# Patient Record
Sex: MALE | Race: WHITE | NOT HISPANIC OR LATINO
[De-identification: names, ages, dates, MRNs, and addresses within clinical notes are randomized per-mention and may not be internally consistent; named-entity substitution may affect disease eponyms.]

---

## 2019-06-13 PROBLEM — Z00.00 ENCOUNTER FOR PREVENTIVE HEALTH EXAMINATION: Status: ACTIVE | Noted: 2019-06-13

## 2019-06-18 ENCOUNTER — FORM ENCOUNTER (OUTPATIENT)
Age: 64
End: 2019-06-18

## 2019-06-19 ENCOUNTER — APPOINTMENT (OUTPATIENT)
Dept: ORTHOPEDIC SURGERY | Facility: CLINIC | Age: 64
End: 2019-06-19
Payer: MEDICARE

## 2019-06-19 ENCOUNTER — OUTPATIENT (OUTPATIENT)
Dept: OUTPATIENT SERVICES | Facility: HOSPITAL | Age: 64
LOS: 1 days | End: 2019-06-19
Payer: MEDICARE

## 2019-06-19 VITALS — BODY MASS INDEX: 31.81 KG/M2 | WEIGHT: 240 LBS | HEIGHT: 73 IN

## 2019-06-19 VITALS — DIASTOLIC BLOOD PRESSURE: 80 MMHG | OXYGEN SATURATION: 99 % | SYSTOLIC BLOOD PRESSURE: 120 MMHG | HEART RATE: 76 BPM

## 2019-06-19 DIAGNOSIS — Z87.891 PERSONAL HISTORY OF NICOTINE DEPENDENCE: ICD-10-CM

## 2019-06-19 DIAGNOSIS — Z86.79 PERSONAL HISTORY OF OTHER DISEASES OF THE CIRCULATORY SYSTEM: ICD-10-CM

## 2019-06-19 DIAGNOSIS — I63.9 CEREBRAL INFARCTION, UNSPECIFIED: ICD-10-CM

## 2019-06-19 DIAGNOSIS — Z82.49 FAMILY HISTORY OF ISCHEMIC HEART DISEASE AND OTHER DISEASES OF THE CIRCULATORY SYSTEM: ICD-10-CM

## 2019-06-19 DIAGNOSIS — Z86.59 PERSONAL HISTORY OF OTHER MENTAL AND BEHAVIORAL DISORDERS: ICD-10-CM

## 2019-06-19 DIAGNOSIS — Z83.3 FAMILY HISTORY OF DIABETES MELLITUS: ICD-10-CM

## 2019-06-19 PROCEDURE — 72020 X-RAY EXAM OF SPINE 1 VIEW: CPT | Mod: 26

## 2019-06-19 PROCEDURE — 72020 X-RAY EXAM OF SPINE 1 VIEW: CPT

## 2019-06-19 PROCEDURE — 99203 OFFICE O/P NEW LOW 30 MIN: CPT

## 2019-06-19 PROCEDURE — 73502 X-RAY EXAM HIP UNI 2-3 VIEWS: CPT

## 2019-06-19 PROCEDURE — 73502 X-RAY EXAM HIP UNI 2-3 VIEWS: CPT | Mod: 26,LT

## 2019-06-29 LAB
BASOPHILS # BLD AUTO: 0.06 K/UL
BASOPHILS NFR BLD AUTO: 1 %
CRP SERPL-MCNC: 3.15 MG/DL
DEPRECATED D DIMER PPP IA-ACNC: 1794 NG/ML DDU
EOSINOPHIL # BLD AUTO: 0.51 K/UL
EOSINOPHIL NFR BLD AUTO: 8.5 %
ERYTHROCYTE [SEDIMENTATION RATE] IN BLOOD BY WESTERGREN METHOD: 70 MM/HR
HCT VFR BLD CALC: 39.3 %
HGB BLD-MCNC: 11.5 G/DL
IMM GRANULOCYTES NFR BLD AUTO: 0.3 %
LYMPHOCYTES # BLD AUTO: 1.47 K/UL
LYMPHOCYTES NFR BLD AUTO: 24.6 %
MAN DIFF?: NORMAL
MCHC RBC-ENTMCNC: 28.1 PG
MCHC RBC-ENTMCNC: 29.3 GM/DL
MCV RBC AUTO: 96.1 FL
MONOCYTES # BLD AUTO: 0.79 K/UL
MONOCYTES NFR BLD AUTO: 13.2 %
NEUTROPHILS # BLD AUTO: 3.12 K/UL
NEUTROPHILS NFR BLD AUTO: 52.4 %
PLATELET # BLD AUTO: 316 K/UL
RBC # BLD: 4.09 M/UL
RBC # FLD: 15.2 %
WBC # FLD AUTO: 5.97 K/UL

## 2019-06-30 RX ORDER — CARVEDILOL 25 MG/1
TABLET, FILM COATED ORAL
Refills: 0 | Status: ACTIVE | COMMUNITY

## 2019-06-30 RX ORDER — ATORVASTATIN CALCIUM 80 MG/1
TABLET, FILM COATED ORAL
Refills: 0 | Status: ACTIVE | COMMUNITY

## 2019-07-12 ENCOUNTER — APPOINTMENT (OUTPATIENT)
Dept: ORTHOPEDIC SURGERY | Facility: CLINIC | Age: 64
End: 2019-07-12

## 2019-07-12 ENCOUNTER — APPOINTMENT (OUTPATIENT)
Dept: INTERVENTIONAL RADIOLOGY/VASCULAR | Facility: HOSPITAL | Age: 64
End: 2019-07-12

## 2019-09-14 PROBLEM — Z82.49 FAMILY HISTORY OF HEART DISEASE: Status: ACTIVE | Noted: 2019-09-14

## 2019-09-14 PROBLEM — I63.9 STROKE: Status: RESOLVED | Noted: 2019-09-14 | Resolved: 2019-09-14

## 2019-09-14 PROBLEM — Z83.3 FAMILY HISTORY OF DIABETES MELLITUS: Status: ACTIVE | Noted: 2019-09-14

## 2019-09-14 PROBLEM — Z87.891 FORMER SMOKER: Status: ACTIVE | Noted: 2019-09-14

## 2019-09-14 PROBLEM — Z86.59 HISTORY OF ANXIETY: Status: RESOLVED | Noted: 2019-09-14 | Resolved: 2019-09-14

## 2019-09-14 PROBLEM — Z86.79 HISTORY OF CORONARY ARTERY DISEASE: Status: RESOLVED | Noted: 2019-09-14 | Resolved: 2019-09-14

## 2019-09-14 NOTE — REVIEW OF SYSTEMS
[Lower Ext Edema] : lower extremity edema [Anxiety] : anxiety [Fever] : no fever [FreeTextEntry9] : other joint pain [Chills] : no chills [de-identified] : rash

## 2019-09-14 NOTE — DISCUSSION/SUMMARY
[de-identified] : 65 yo male recovering slowly less than 1 months s/p DAIR for periprosthetic infection.  While history and physical exam do not clearly demonstrate eradication of infection, neither do they reveal treatment failure.  I recommend getting labs today and continuing prescribed antibiotic course.  If symptoms worse, f/u ASAP.  If symptoms start to markedly improve, continue current care.  If symptoms fail to improve rapidly in next few weeks, would encourage left hip aspiration under image guidance.  My staff will help to arrange.

## 2019-09-14 NOTE — PHYSICAL EXAM
[LE] : Sensory: Intact in bilateral lower extremities [PT] : posterior tibial 2+ and symmetric bilaterally [de-identified] : Alert and oriented x3.\par Well appearing in NAD.\par LEft coxalgic gait.\par Lumbar spine: No visible deformity. No tenderness. No radicular pain with passive straight leg raise bilaterally. \par Pelvis: No pelvic obliquity.  No tenderness.\par Limb lengths: No visible leg length difference.\par Right hip: Skin intact. Well healed surgical scar.  No erythema, ecchymosis or swelling.  No tenderness. Unrestricted ROM. Impingement, SHWETHA and Stinchfield painless. Flexor/abductor power 5/5.\par Left hip: Healing posterolateral surgical scar without dehiscence.  Mild to moderate faye-incisional erythema with mild induration. No ecchymosis.  or swelling.  Moderate tenderness. Moderately painful and restricted ROM. Flexor/abductor power 4-/5, limited by pain.\par Right knee: Skin intact. No erythema, ecchymosis or effusion. No deformity.  No swelling. No tenderness.  Patellar grind painless.  Painless ROM.  \par Left knee:  Skin intact. No erythema, ecchymosis or effusion. No deformity.  No swelling. No tenderness.  Patellar grind painless.  Painless ROM.   [de-identified] : XR LS spine lateral: There is no significant loss of vertebral body height. There is mild retrolisthesis of L3 on L4. There is multilevel discogenic degenerative disease of the lumbar spine as manifested by disc space narrowing and endplate osteophytosis, most pronounced at L3-L4 and L4-L5.\par \par XR pelvis and left hip: There are bilateral total hip arthroplasties, which are normal alignment without evidence for hardware complication. There is no acute fracture or dislocation. There is no visible soft tissue abnormality.

## 2019-09-14 NOTE — HISTORY OF PRESENT ILLNESS
[de-identified] : 63 yo male with h/o uncomplicated right THR underwent left THR in [place] 4/25/19 complicated by wound problems and infection with DAIR 5/22/2019.  Care was in [place] where he lives.  Has had ongoing pain and redness around the surgical site.  Although it has been improving, this has been very slow and he is here seeking second opinion.  Still on IV antibiotics.

## 2019-10-30 ENCOUNTER — FORM ENCOUNTER (OUTPATIENT)
Age: 64
End: 2019-10-30

## 2019-10-31 ENCOUNTER — OUTPATIENT (OUTPATIENT)
Dept: OUTPATIENT SERVICES | Facility: HOSPITAL | Age: 64
LOS: 1 days | End: 2019-10-31
Payer: MEDICARE

## 2019-10-31 ENCOUNTER — APPOINTMENT (OUTPATIENT)
Dept: ORTHOPEDIC SURGERY | Facility: CLINIC | Age: 64
End: 2019-10-31
Payer: MEDICARE

## 2019-10-31 VITALS — BODY MASS INDEX: 31.54 KG/M2 | WEIGHT: 238 LBS | HEIGHT: 73 IN

## 2019-10-31 DIAGNOSIS — Z01.818 ENCOUNTER FOR OTHER PREPROCEDURAL EXAMINATION: ICD-10-CM

## 2019-10-31 LAB
ANION GAP SERPL CALC-SCNC: 13 MMOL/L — SIGNIFICANT CHANGE UP (ref 5–17)
APPEARANCE UR: CLEAR — SIGNIFICANT CHANGE UP
APTT BLD: 28.9 SEC — SIGNIFICANT CHANGE UP (ref 27.5–36.3)
B PERT IGG+IGM PNL SER: SIGNIFICANT CHANGE UP
BACTERIA # UR AUTO: PRESENT /HPF
BILIRUB UR-MCNC: ABNORMAL
BUN SERPL-MCNC: 26 MG/DL — HIGH (ref 7–23)
CALCIUM SERPL-MCNC: 9.5 MG/DL — SIGNIFICANT CHANGE UP (ref 8.4–10.5)
CHLORIDE SERPL-SCNC: 105 MMOL/L — SIGNIFICANT CHANGE UP (ref 96–108)
CO2 SERPL-SCNC: 26 MMOL/L — SIGNIFICANT CHANGE UP (ref 22–31)
COD CRY URNS QL: ABNORMAL /HPF
COLOR FLD: SIGNIFICANT CHANGE UP
COLOR SPEC: SIGNIFICANT CHANGE UP
COMMENT - FLUIDS: SIGNIFICANT CHANGE UP
CREAT SERPL-MCNC: 1.28 MG/DL — SIGNIFICANT CHANGE UP (ref 0.5–1.3)
CRP SERPL-MCNC: 26.14 MG/DL — HIGH (ref 0–0.4)
DIFF PNL FLD: NEGATIVE — SIGNIFICANT CHANGE UP
EPI CELLS # UR: ABNORMAL /HPF (ref 0–5)
ERYTHROCYTE [SEDIMENTATION RATE] IN BLOOD: 50 MM/HR — HIGH
FLUID INTAKE SUBSTANCE CLASS: SIGNIFICANT CHANGE UP
FLUID SEGMENTED GRANULOCYTES: 98 % — SIGNIFICANT CHANGE UP
GLUCOSE SERPL-MCNC: 108 MG/DL — HIGH (ref 70–99)
GLUCOSE UR QL: NEGATIVE — SIGNIFICANT CHANGE UP
GRAM STN FLD: SIGNIFICANT CHANGE UP
HBA1C BLD-MCNC: 5.5 % — SIGNIFICANT CHANGE UP (ref 4–5.6)
HCT VFR BLD CALC: 42 % — SIGNIFICANT CHANGE UP (ref 39–50)
HGB BLD-MCNC: 13.5 G/DL — SIGNIFICANT CHANGE UP (ref 13–17)
HYALINE CASTS # UR AUTO: ABNORMAL /LPF (ref 0–2)
INR BLD: 1.09 — SIGNIFICANT CHANGE UP (ref 0.88–1.16)
KETONES UR-MCNC: ABNORMAL MG/DL
LEUKOCYTE ESTERASE UR-ACNC: NEGATIVE — SIGNIFICANT CHANGE UP
LYMPHOCYTES # FLD: 1 % — SIGNIFICANT CHANGE UP
MCHC RBC-ENTMCNC: 30.1 PG — SIGNIFICANT CHANGE UP (ref 27–34)
MCHC RBC-ENTMCNC: 32.1 GM/DL — SIGNIFICANT CHANGE UP (ref 32–36)
MCV RBC AUTO: 93.5 FL — SIGNIFICANT CHANGE UP (ref 80–100)
MONOS+MACROS # FLD: 1 % — SIGNIFICANT CHANGE UP
NITRITE UR-MCNC: NEGATIVE — SIGNIFICANT CHANGE UP
NRBC # BLD: 0 /100 WBCS — SIGNIFICANT CHANGE UP (ref 0–0)
PH UR: 6 — SIGNIFICANT CHANGE UP (ref 5–8)
PLATELET # BLD AUTO: 195 K/UL — SIGNIFICANT CHANGE UP (ref 150–400)
POTASSIUM SERPL-MCNC: 4.5 MMOL/L — SIGNIFICANT CHANGE UP (ref 3.5–5.3)
POTASSIUM SERPL-SCNC: 4.5 MMOL/L — SIGNIFICANT CHANGE UP (ref 3.5–5.3)
PROT UR-MCNC: 30 MG/DL
PROTHROM AB SERPL-ACNC: 12.3 SEC — SIGNIFICANT CHANGE UP (ref 10–12.9)
RBC # BLD: 4.49 M/UL — SIGNIFICANT CHANGE UP (ref 4.2–5.8)
RBC # FLD: 12.6 % — SIGNIFICANT CHANGE UP (ref 10.3–14.5)
RBC CASTS # UR COMP ASSIST: < 5 /HPF — SIGNIFICANT CHANGE UP
RCV VOL RI: HIGH /UL (ref 0–5)
SODIUM SERPL-SCNC: 144 MMOL/L — SIGNIFICANT CHANGE UP (ref 135–145)
SP GR SPEC: 1.02 — SIGNIFICANT CHANGE UP (ref 1–1.03)
SPECIMEN SOURCE FLD: SIGNIFICANT CHANGE UP
SPECIMEN SOURCE: SIGNIFICANT CHANGE UP
SYNOVIAL CRYSTALS CLARITY: ABNORMAL
SYNOVIAL CRYSTALS COLOR: ABNORMAL
SYNOVIAL CRYSTALS ID: SIGNIFICANT CHANGE UP
SYNOVIAL CRYSTALS TUBE: SIGNIFICANT CHANGE UP
TOTAL NUCLEATED CELL COUNT, BODY FLUID: HIGH /UL (ref 0–5)
TUBE TYPE: SIGNIFICANT CHANGE UP
UROBILINOGEN FLD QL: 0.2 E.U./DL — SIGNIFICANT CHANGE UP
WBC # BLD: 11.66 K/UL — HIGH (ref 3.8–10.5)
WBC # FLD AUTO: 11.66 K/UL — HIGH (ref 3.8–10.5)
WBC UR QL: < 5 /HPF — SIGNIFICANT CHANGE UP

## 2019-10-31 PROCEDURE — 71046 X-RAY EXAM CHEST 2 VIEWS: CPT

## 2019-10-31 PROCEDURE — 87184 SC STD DISK METHOD PER PLATE: CPT

## 2019-10-31 PROCEDURE — 89051 BODY FLUID CELL COUNT: CPT

## 2019-10-31 PROCEDURE — 85027 COMPLETE CBC AUTOMATED: CPT

## 2019-10-31 PROCEDURE — 93005 ELECTROCARDIOGRAM TRACING: CPT

## 2019-10-31 PROCEDURE — 85730 THROMBOPLASTIN TIME PARTIAL: CPT

## 2019-10-31 PROCEDURE — 87186 SC STD MICRODIL/AGAR DIL: CPT

## 2019-10-31 PROCEDURE — 80048 BASIC METABOLIC PNL TOTAL CA: CPT

## 2019-10-31 PROCEDURE — 87075 CULTR BACTERIA EXCEPT BLOOD: CPT

## 2019-10-31 PROCEDURE — 85610 PROTHROMBIN TIME: CPT

## 2019-10-31 PROCEDURE — 87086 URINE CULTURE/COLONY COUNT: CPT

## 2019-10-31 PROCEDURE — 87205 SMEAR GRAM STAIN: CPT

## 2019-10-31 PROCEDURE — 87070 CULTURE OTHR SPECIMN AEROBIC: CPT

## 2019-10-31 PROCEDURE — 86140 C-REACTIVE PROTEIN: CPT

## 2019-10-31 PROCEDURE — 89060 EXAM SYNOVIAL FLUID CRYSTALS: CPT

## 2019-10-31 PROCEDURE — 83036 HEMOGLOBIN GLYCOSYLATED A1C: CPT

## 2019-10-31 PROCEDURE — 72020 X-RAY EXAM OF SPINE 1 VIEW: CPT

## 2019-10-31 PROCEDURE — 20610 DRAIN/INJ JOINT/BURSA W/O US: CPT | Mod: LT

## 2019-10-31 PROCEDURE — 71046 X-RAY EXAM CHEST 2 VIEWS: CPT | Mod: 26

## 2019-10-31 PROCEDURE — 73502 X-RAY EXAM HIP UNI 2-3 VIEWS: CPT

## 2019-10-31 PROCEDURE — 85652 RBC SED RATE AUTOMATED: CPT

## 2019-10-31 PROCEDURE — 81001 URINALYSIS AUTO W/SCOPE: CPT

## 2019-10-31 PROCEDURE — 72020 X-RAY EXAM OF SPINE 1 VIEW: CPT | Mod: 26

## 2019-10-31 PROCEDURE — 73502 X-RAY EXAM HIP UNI 2-3 VIEWS: CPT | Mod: 26,LT

## 2019-10-31 PROCEDURE — 93010 ELECTROCARDIOGRAM REPORT: CPT

## 2019-10-31 PROCEDURE — 99214 OFFICE O/P EST MOD 30 MIN: CPT | Mod: 25

## 2019-10-31 NOTE — END OF VISIT
[>50% of Time Spent on Counseling for ____] : Greater than 50% of the encounter time was spent on counseling for [unfilled] [Time Spent: ___ minutes] : I have spent [unfilled] minutes of face to face time with the patient [FreeTextEntry3] : All medical record entries made by Vanessa Hanna acting as a scribe for the performing provider (Rogerio Mead MD and/or JULIA Birmingham) on 10/31/2019. All entries were dictated to me by the performing medical provider. In signing this record, the medical provider affirms that they have personally performed the history, physical exam, assessment and plan and have also directed, reviewed and agreed to the documentation in the chart.

## 2019-10-31 NOTE — DISCUSSION/SUMMARY
[de-identified] : Mr. Maya presents with left hip pain s/p left THR 4/25/19 complicated by wound problems and infection with DAIR 5/22/19. I aspirated 11 cc of cloudy, pus-like fluid from the side of the patient's left greater trochanter/bursa. I informed patient that the fluid appears infected but that I would be sending it to the lab for further analysis. We discussed how the oral antibiotics he was on until 10 days ago may have suppressed the infection, causing his most recent blood work labs to appear normal. I ordered blood tests for infection as well as pre-operative blood tests in case patient needs a revision operation. \par If patient's fluid analysis and blood tests demonstrate that he has an infection, I informed him that we will have to do a two stage revision for infection. In the first stage, a spacer with antibiotic cement will be placed in his hip. I informed patient that I use a spacer that allows some mobility. While he has the spacer, he will be on IV antibiotics for about 6 weeks. If his wound is healing well and his inflammatory markers aren't high, then patient will be put on oral antibiotics for 3-4 weeks. When he's physiologically ready to have another operation, patient will go off of the antibiotics to look for evidence of infection that could have been suppressed by the abx. If he does not show evidence of infection, then he will have a second operation to implant a new hip device. We discussed the risks, benefits and recovery time of this operation.\par I advised patient to take OTC Tylenol and antiinflammatories for pain relief. He should avoid narcotics before an operation.\par Follow up by calling the office by fluid analysis and blood test results.

## 2019-10-31 NOTE — PROCEDURE
[Aspiration] : Aspiration [Left] : of the left [Greater Trochanter] : greater trochanteric bursa [Diagnostic] : Diagnostic [Patient] : patient [Risk] : risk [Benefits] : benefits [Alternatives] : alternatives [Bleeding] : bleeding [Infection] : infection [Allergic Reaction] : allergic reaction [Verbal Consent Obtained] : verbal consent was obtained prior to the procedure [Alcohol] : Alcohol [Betadine] : Betadine [Ethyl Chloride Spray] : ethyl chloride spray was used as a topical anesthetic [Lateral] : lateral [18] : an 18-gauge [1.5  inch] : 1.5 inch needle [___ mL Fluid] : [unfilled] mL of [Cloudy] : cloudy [Same Needle/New Syringe] : the syringe was changed and the same needle was left in place and [Bandage Applied] : a bandage [Tolerated Well] : The patient tolerated the procedure well [None] : none [No Strenuous Activity___day(s)] : avoid strenuous activity for [unfilled] day(s) [PRN] : as needed

## 2019-10-31 NOTE — HISTORY OF PRESENT ILLNESS
[de-identified] : 64 year old M presents today for follow up evaluation of left hip pain s/p left THR 4/25/19 complicated by wound problems and infection with DAIR 5/22/19. He reports moderate, daily left hip area pain localized to the thigh and side of the hip. He also reports left thigh and knee pain as well as a feeling of tingling in the anterior left thigh. He denies back pain. The pain occurs upon rising in the morning and with activity. He has difficulty placing shoes on the left foot. Patient can walk 5-10 blocks with a moderate limp and a cane for long walks. He uses a rail to ascend and descend stairs. He can enter public transportation and sit comfortably in an ordinary chair. He reports that he stopped taking oral antibiotics 10 days ago and had recent blood work that was not concerning for infection.\par He reports that he was diagnosed by another physician with trochanteric bursitis about 2-3 months ago and was prescribed physical therapy. He states that his left thigh and knee pain worsened with PT and was especially severe upon rising in the morning. Then, he had a cortisone injection to the side of the hip which he reports did not help to improve his symptoms.

## 2019-10-31 NOTE — PHYSICAL EXAM
[de-identified] : Well appearing. NAD. Alert and oriented x 3. No respiratory distress.\par Bilateral upper extremities: Skin intact. No deformity. Painless active ROM without evident restriction.\par Cervical, thoracic and lumbar spine: No visible deformity. Painless active ROM without evident restriction. No radicular pain on passive straight leg raise bilaterally.\par \par Pelvis: No pelvic obliquity. No tenderness.\par Leg lengths: Left leg ~1-2 mm shorter than right.\par \par Gait: Left coxalgic.\par Ambulatory assist devices: None.\par \par Right Hip:\par Skin intact. No surgical scars.No erythema. No ecchymosis. No swelling. No deformity. No focal tenderness.\par Painless ROM from full extension to 115 degrees of flexion. 10-15 degrees of internal rotation. 60 degrees of external rotation. 65 degrees of abduction. 20 degrees of adduction.\par SHWETHA painless. Impingement (FADIR) painless. Stinchfield painless. No crepitation. No instability.\par \par Left Hip:\par Skin intact. (+) Majority of anterolateral incision appears well healed however there is an area of dry and flaky skin around the mid-portion of the incision in the area where he previously had drainage. There is mild, generalized erythema around the incision and a fluctuant peritrochanteric collection is palpable. No focal tenderness.\par ROM from full extension to 115 degrees of flexion. 2-3 degrees of obligate external rotation. 65 degrees of external rotation. 65 degrees of abduction. 5 degrees of adduction.\par SHWETHA painless. Impingement (FADIR) painless. Stinchfield painful\par No crepitation. No instability. Abductor power 1-2/5, can only lift leg when turning the foot out.\par \par Bilateral Knees: Skin intact. No surgical scars. No erythema or ecchymosis. No swelling or effusion. No deformity. No focal tenderness. Painless ROM from full extension to 135 degrees of flexion. Central patellar tracking. (+) Minor crepitation. No instability. \par \par Neurological: Intact distal crude touch sensation. Normal distal motor power. \par Cardiovascular: Lower extremities warm and well perfused. No peripheral edema. [de-identified] : No new imaging was reviewed at this time.

## 2019-11-01 LAB
CULTURE RESULTS: NO GROWTH — SIGNIFICANT CHANGE UP
SPECIMEN SOURCE: SIGNIFICANT CHANGE UP

## 2019-11-02 LAB
-  CEFAZOLIN: SIGNIFICANT CHANGE UP
-  CLINDAMYCIN: SIGNIFICANT CHANGE UP
-  ERYTHROMYCIN: SIGNIFICANT CHANGE UP
-  LINEZOLID: SIGNIFICANT CHANGE UP
-  OXACILLIN: SIGNIFICANT CHANGE UP
-  PENICILLIN: SIGNIFICANT CHANGE UP
-  RIFAMPIN: SIGNIFICANT CHANGE UP
-  TRIMETHOPRIM/SULFAMETHOXAZOLE: SIGNIFICANT CHANGE UP
-  VANCOMYCIN: SIGNIFICANT CHANGE UP
CULTURE RESULTS: SIGNIFICANT CHANGE UP
METHOD TYPE: SIGNIFICANT CHANGE UP
ORGANISM # SPEC MICROSCOPIC CNT: SIGNIFICANT CHANGE UP
ORGANISM # SPEC MICROSCOPIC CNT: SIGNIFICANT CHANGE UP
SPECIMEN SOURCE: SIGNIFICANT CHANGE UP

## 2019-11-03 LAB
ANABASINE UR-MCNC: <2 NG/ML — SIGNIFICANT CHANGE UP
COTININE UR-MCNC: <5 NG/ML — SIGNIFICANT CHANGE UP
NICOTINE UR-MCNC: <5 NG/ML — SIGNIFICANT CHANGE UP
NORNICOTINE UR-MCNC: <2 NG/ML — SIGNIFICANT CHANGE UP

## 2019-11-04 ENCOUNTER — RX CHANGE (OUTPATIENT)
Age: 64
End: 2019-11-04

## 2019-11-04 ENCOUNTER — RX RENEWAL (OUTPATIENT)
Age: 64
End: 2019-11-04

## 2019-11-04 RX ORDER — TRAMADOL HYDROCHLORIDE 50 MG/1
50 TABLET, COATED ORAL 3 TIMES DAILY
Qty: 12 | Refills: 0 | Status: COMPLETED | COMMUNITY
Start: 2019-11-04 | End: 2019-11-04

## 2019-11-10 ENCOUNTER — INPATIENT (INPATIENT)
Facility: HOSPITAL | Age: 64
LOS: 8 days | Discharge: HOME HEALTH | DRG: 481 | End: 2019-11-19
Attending: ORTHOPAEDIC SURGERY | Admitting: ORTHOPAEDIC SURGERY
Payer: MEDICARE

## 2019-11-10 VITALS
DIASTOLIC BLOOD PRESSURE: 81 MMHG | TEMPERATURE: 98 F | OXYGEN SATURATION: 96 % | WEIGHT: 238.1 LBS | SYSTOLIC BLOOD PRESSURE: 153 MMHG | RESPIRATION RATE: 18 BRPM | HEART RATE: 81 BPM | HEIGHT: 72 IN

## 2019-11-10 DIAGNOSIS — E78.5 HYPERLIPIDEMIA, UNSPECIFIED: ICD-10-CM

## 2019-11-10 DIAGNOSIS — I95.9 HYPOTENSION, UNSPECIFIED: ICD-10-CM

## 2019-11-10 DIAGNOSIS — M21.372 FOOT DROP, LEFT FOOT: ICD-10-CM

## 2019-11-10 DIAGNOSIS — I10 ESSENTIAL (PRIMARY) HYPERTENSION: ICD-10-CM

## 2019-11-10 DIAGNOSIS — I25.10 ATHEROSCLEROTIC HEART DISEASE OF NATIVE CORONARY ARTERY WITHOUT ANGINA PECTORIS: ICD-10-CM

## 2019-11-10 DIAGNOSIS — M25.559 PAIN IN UNSPECIFIED HIP: ICD-10-CM

## 2019-11-10 DIAGNOSIS — M00.9 PYOGENIC ARTHRITIS, UNSPECIFIED: ICD-10-CM

## 2019-11-10 DIAGNOSIS — T84.52XA INFECTION AND INFLAMMATORY REACTION DUE TO INTERNAL LEFT HIP PROSTHESIS, INITIAL ENCOUNTER: ICD-10-CM

## 2019-11-10 DIAGNOSIS — T81.32XA DISRUPTION OF INTERNAL OPERATION (SURGICAL) WOUND, NOT ELSEWHERE CLASSIFIED, INITIAL ENCOUNTER: ICD-10-CM
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
## 2019-11-10 DIAGNOSIS — D62 ACUTE POSTHEMORRHAGIC ANEMIA: ICD-10-CM

## 2019-11-10 DIAGNOSIS — I82.412 ACUTE EMBOLISM AND THROMBOSIS OF LEFT FEMORAL VEIN: ICD-10-CM

## 2019-11-10 DIAGNOSIS — E66.9 OBESITY, UNSPECIFIED: ICD-10-CM

## 2019-11-10 LAB
ALBUMIN SERPL ELPH-MCNC: 3.7 G/DL — SIGNIFICANT CHANGE UP (ref 3.3–5)
ALP SERPL-CCNC: 63 U/L — SIGNIFICANT CHANGE UP (ref 40–120)
ALT FLD-CCNC: 13 U/L — SIGNIFICANT CHANGE UP (ref 10–45)
ANION GAP SERPL CALC-SCNC: 13 MMOL/L — SIGNIFICANT CHANGE UP (ref 5–17)
APPEARANCE UR: CLEAR — SIGNIFICANT CHANGE UP
APTT BLD: 30.8 SEC — SIGNIFICANT CHANGE UP (ref 27.5–36.3)
AST SERPL-CCNC: 13 U/L — SIGNIFICANT CHANGE UP (ref 10–40)
BASOPHILS # BLD AUTO: 0.08 K/UL — SIGNIFICANT CHANGE UP (ref 0–0.2)
BASOPHILS NFR BLD AUTO: 0.6 % — SIGNIFICANT CHANGE UP (ref 0–2)
BILIRUB SERPL-MCNC: 0.2 MG/DL — SIGNIFICANT CHANGE UP (ref 0.2–1.2)
BILIRUB UR-MCNC: NEGATIVE — SIGNIFICANT CHANGE UP
BLD GP AB SCN SERPL QL: NEGATIVE — SIGNIFICANT CHANGE UP
BUN SERPL-MCNC: 18 MG/DL — SIGNIFICANT CHANGE UP (ref 7–23)
CALCIUM SERPL-MCNC: 9.4 MG/DL — SIGNIFICANT CHANGE UP (ref 8.4–10.5)
CHLORIDE SERPL-SCNC: 104 MMOL/L — SIGNIFICANT CHANGE UP (ref 96–108)
CO2 SERPL-SCNC: 23 MMOL/L — SIGNIFICANT CHANGE UP (ref 22–31)
COLOR SPEC: YELLOW — SIGNIFICANT CHANGE UP
CREAT SERPL-MCNC: 0.9 MG/DL — SIGNIFICANT CHANGE UP (ref 0.5–1.3)
DIFF PNL FLD: NEGATIVE — SIGNIFICANT CHANGE UP
EOSINOPHIL # BLD AUTO: 0.34 K/UL — SIGNIFICANT CHANGE UP (ref 0–0.5)
EOSINOPHIL NFR BLD AUTO: 2.7 % — SIGNIFICANT CHANGE UP (ref 0–6)
GLUCOSE SERPL-MCNC: 102 MG/DL — HIGH (ref 70–99)
GLUCOSE UR QL: NEGATIVE — SIGNIFICANT CHANGE UP
HCT VFR BLD CALC: 40 % — SIGNIFICANT CHANGE UP (ref 39–50)
HGB BLD-MCNC: 12.7 G/DL — LOW (ref 13–17)
IMM GRANULOCYTES NFR BLD AUTO: 0.6 % — SIGNIFICANT CHANGE UP (ref 0–1.5)
INR BLD: 1.21 — HIGH (ref 0.88–1.16)
KETONES UR-MCNC: NEGATIVE — SIGNIFICANT CHANGE UP
LACTATE SERPL-SCNC: 1.1 MMOL/L — SIGNIFICANT CHANGE UP (ref 0.5–2)
LEUKOCYTE ESTERASE UR-ACNC: NEGATIVE — SIGNIFICANT CHANGE UP
LYMPHOCYTES # BLD AUTO: 17.2 % — SIGNIFICANT CHANGE UP (ref 13–44)
LYMPHOCYTES # BLD AUTO: 2.21 K/UL — SIGNIFICANT CHANGE UP (ref 1–3.3)
MCHC RBC-ENTMCNC: 29.2 PG — SIGNIFICANT CHANGE UP (ref 27–34)
MCHC RBC-ENTMCNC: 31.8 GM/DL — LOW (ref 32–36)
MCV RBC AUTO: 92 FL — SIGNIFICANT CHANGE UP (ref 80–100)
MONOCYTES # BLD AUTO: 1.29 K/UL — HIGH (ref 0–0.9)
MONOCYTES NFR BLD AUTO: 10.1 % — SIGNIFICANT CHANGE UP (ref 2–14)
NEUTROPHILS # BLD AUTO: 8.83 K/UL — HIGH (ref 1.8–7.4)
NEUTROPHILS NFR BLD AUTO: 68.8 % — SIGNIFICANT CHANGE UP (ref 43–77)
NITRITE UR-MCNC: NEGATIVE — SIGNIFICANT CHANGE UP
NRBC # BLD: 0 /100 WBCS — SIGNIFICANT CHANGE UP (ref 0–0)
PH UR: 6 — SIGNIFICANT CHANGE UP (ref 5–8)
PLATELET # BLD AUTO: 400 K/UL — SIGNIFICANT CHANGE UP (ref 150–400)
POTASSIUM SERPL-MCNC: 4.1 MMOL/L — SIGNIFICANT CHANGE UP (ref 3.5–5.3)
POTASSIUM SERPL-SCNC: 4.1 MMOL/L — SIGNIFICANT CHANGE UP (ref 3.5–5.3)
PROT SERPL-MCNC: 7.6 G/DL — SIGNIFICANT CHANGE UP (ref 6–8.3)
PROT UR-MCNC: NEGATIVE MG/DL — SIGNIFICANT CHANGE UP
PROTHROM AB SERPL-ACNC: 13.7 SEC — HIGH (ref 10–12.9)
RBC # BLD: 4.35 M/UL — SIGNIFICANT CHANGE UP (ref 4.2–5.8)
RBC # FLD: 11.9 % — SIGNIFICANT CHANGE UP (ref 10.3–14.5)
RH IG SCN BLD-IMP: POSITIVE — SIGNIFICANT CHANGE UP
SODIUM SERPL-SCNC: 140 MMOL/L — SIGNIFICANT CHANGE UP (ref 135–145)
SP GR SPEC: >=1.03 — SIGNIFICANT CHANGE UP (ref 1–1.03)
UROBILINOGEN FLD QL: 0.2 E.U./DL — SIGNIFICANT CHANGE UP
WBC # BLD: 12.83 K/UL — HIGH (ref 3.8–10.5)
WBC # FLD AUTO: 12.83 K/UL — HIGH (ref 3.8–10.5)

## 2019-11-10 PROCEDURE — 99221 1ST HOSP IP/OBS SF/LOW 40: CPT | Mod: 57,AI

## 2019-11-10 PROCEDURE — 71046 X-RAY EXAM CHEST 2 VIEWS: CPT | Mod: 26

## 2019-11-10 PROCEDURE — 93010 ELECTROCARDIOGRAM REPORT: CPT

## 2019-11-10 PROCEDURE — 73502 X-RAY EXAM HIP UNI 2-3 VIEWS: CPT | Mod: 26,LT

## 2019-11-10 PROCEDURE — 99285 EMERGENCY DEPT VISIT HI MDM: CPT

## 2019-11-10 RX ORDER — POVIDONE-IODINE 5 %
1 AEROSOL (ML) TOPICAL ONCE
Refills: 0 | Status: DISCONTINUED | OUTPATIENT
Start: 2019-11-10 | End: 2019-11-19

## 2019-11-10 RX ORDER — SODIUM CHLORIDE 9 MG/ML
3 INJECTION INTRAMUSCULAR; INTRAVENOUS; SUBCUTANEOUS ONCE
Refills: 0 | Status: COMPLETED | OUTPATIENT
Start: 2019-11-10 | End: 2019-11-10

## 2019-11-10 RX ORDER — ATORVASTATIN CALCIUM 80 MG/1
1 TABLET, FILM COATED ORAL
Qty: 0 | Refills: 0 | DISCHARGE

## 2019-11-10 RX ORDER — METOCLOPRAMIDE HCL 10 MG
10 TABLET ORAL EVERY 6 HOURS
Refills: 0 | Status: DISCONTINUED | OUTPATIENT
Start: 2019-11-10 | End: 2019-11-19

## 2019-11-10 RX ORDER — OXYCODONE HYDROCHLORIDE 5 MG/1
5 TABLET ORAL EVERY 4 HOURS
Refills: 0 | Status: DISCONTINUED | OUTPATIENT
Start: 2019-11-10 | End: 2019-11-15

## 2019-11-10 RX ORDER — CARVEDILOL PHOSPHATE 80 MG/1
12.5 CAPSULE, EXTENDED RELEASE ORAL
Refills: 0 | Status: DISCONTINUED | OUTPATIENT
Start: 2019-11-10 | End: 2019-11-10

## 2019-11-10 RX ORDER — OXYCODONE HYDROCHLORIDE 5 MG/1
10 TABLET ORAL EVERY 4 HOURS
Refills: 0 | Status: DISCONTINUED | OUTPATIENT
Start: 2019-11-10 | End: 2019-11-15

## 2019-11-10 RX ORDER — CARVEDILOL PHOSPHATE 80 MG/1
1 CAPSULE, EXTENDED RELEASE ORAL
Qty: 0 | Refills: 0 | DISCHARGE

## 2019-11-10 RX ORDER — HYDROMORPHONE HYDROCHLORIDE 2 MG/ML
0.5 INJECTION INTRAMUSCULAR; INTRAVENOUS; SUBCUTANEOUS EVERY 4 HOURS
Refills: 0 | Status: DISCONTINUED | OUTPATIENT
Start: 2019-11-10 | End: 2019-11-15

## 2019-11-10 RX ORDER — ATORVASTATIN CALCIUM 80 MG/1
80 TABLET, FILM COATED ORAL AT BEDTIME
Refills: 0 | Status: DISCONTINUED | OUTPATIENT
Start: 2019-11-10 | End: 2019-11-19

## 2019-11-10 RX ORDER — POVIDONE-IODINE 5 %
1 AEROSOL (ML) TOPICAL EVERY 12 HOURS
Refills: 0 | Status: DISCONTINUED | OUTPATIENT
Start: 2019-11-10 | End: 2019-11-10

## 2019-11-10 RX ORDER — SODIUM CHLORIDE 9 MG/ML
1000 INJECTION, SOLUTION INTRAVENOUS
Refills: 0 | Status: DISCONTINUED | OUTPATIENT
Start: 2019-11-10 | End: 2019-11-19

## 2019-11-10 RX ORDER — CARVEDILOL PHOSPHATE 80 MG/1
12.5 CAPSULE, EXTENDED RELEASE ORAL EVERY 12 HOURS
Refills: 0 | Status: DISCONTINUED | OUTPATIENT
Start: 2019-11-10 | End: 2019-11-19

## 2019-11-10 RX ADMIN — OXYCODONE HYDROCHLORIDE 10 MILLIGRAM(S): 5 TABLET ORAL at 23:20

## 2019-11-10 RX ADMIN — CARVEDILOL PHOSPHATE 12.5 MILLIGRAM(S): 80 CAPSULE, EXTENDED RELEASE ORAL at 23:36

## 2019-11-10 RX ADMIN — OXYCODONE HYDROCHLORIDE 10 MILLIGRAM(S): 5 TABLET ORAL at 22:20

## 2019-11-10 RX ADMIN — SODIUM CHLORIDE 3 MILLILITER(S): 9 INJECTION INTRAMUSCULAR; INTRAVENOUS; SUBCUTANEOUS at 19:30

## 2019-11-10 RX ADMIN — ATORVASTATIN CALCIUM 80 MILLIGRAM(S): 80 TABLET, FILM COATED ORAL at 22:20

## 2019-11-10 NOTE — ED ADULT NURSE NOTE - INTERVENTIONS DEFINITIONS
Physically safe environment: no spills, clutter or unnecessary equipment/Stretcher in lowest position, wheels locked, appropriate side rails in place/Monitor gait and stability/Non-slip footwear when patient is off stretcher

## 2019-11-10 NOTE — H&P ADULT - NSHPPHYSICALEXAM_GEN_ALL_CORE
General: well appearing in NAD    LLE:   wound w active purulent drainage. erythema noted  no other erations nearby.   no active bleeding  SILT over distal limb and symmetric to contralateral side  cap refill < 2 seconds  5/5 EHL/FHL/GS/TA

## 2019-11-10 NOTE — ED PROVIDER NOTE - CLINICAL SUMMARY MEDICAL DECISION MAKING FREE TEXT BOX
left hip infection- prev Left THR-- on po abx - will need revision per Dr Mead 65 yo male with  probable left hip infection s/p THR  6 months ago- on po abx - will likely  need revision per Dr Mead

## 2019-11-10 NOTE — ED PROVIDER NOTE - LOWER EXTREMITY EXAM, LEFT
LIMITED ROM/left hip incision  mild induration erythema - ? drainage-  ? mild superficial cellulitis

## 2019-11-10 NOTE — H&P ADULT - HISTORY OF PRESENT ILLNESS
64 M history of multiple stents, HTN, HLD w b/l Hip replacements complicated by L hip infection treated w debridement and prolonged period of abx however infection persists after completing antibiotic regimen. Pt states the initial surgery was in April of this year, the infection began shortly after and was  treated w a debridement by the original surgeon at an OSH. after the debridement he was placed on a prolonged course of IV abx and after this finished in october the infection came back. After seeing Dr. Mead and sending him updated pictures today 11/10 he was sent in for medical management and for explant and placement of a spacer. Pt does endorse fevers and chills. states hes had purulent drainage from the incision. no numbness, tingling or weakness reported. Otherwise healthy 64 M history of multiple stents, HTN, HLD w b/l Hip replacements complicated by L hip infection treated w debridement and prolonged period of abx however infection persists after completing antibiotic regimen. Pt states the initial surgery was in April of this year, the infection began shortly after and was  treated w a debridement by the original surgeon at an OSH. after the debridement he was placed on a prolonged course of IV abx and after this finished in october the infection came back as proven by hip aspiration several days ago. After seeing Dr. Mead and sending him updated pictures today 11/10 he was sent in for medical management and for explant and placement of a spacer. Pt does endorse fevers and chills. States he has had purulent drainage from the incision. no numbness, tingling or weakness reported. Otherwise healthy

## 2019-11-10 NOTE — ED ADULT TRIAGE NOTE - ARRIVAL INFO ADDITIONAL COMMENTS
states he had left hip surgery on April 25th and had a debridement in may 22. c.o intermittent use of antibiotics of infection to site since then. states he was sent by md castro for possible surgery tomoroow. denies any recent injuries, numbness/tingling to lower extremities, bowel/bladder incontinence/complaints, fever/chills.

## 2019-11-10 NOTE — H&P ADULT - ATTENDING COMMENTS
65 yo obese male with chronic periprosthetic left hip infection, now with draining sinus.  Known MSSA based on office aspiration cultures.  Admitted for first stage revision FRANCINE, IV antibiotics, medical observation and treatment.

## 2019-11-10 NOTE — H&P ADULT - NSHPLABSRESULTS_GEN_ALL_CORE
LABS:                        12.7   12.83 )-----------( 400      ( 10 Nov 2019 20:08 )             40.0     10 Nov 2019 20:08    140    |  104    |  18     ----------------------------<  102    4.1     |  23     |  0.90     Ca    9.4        10 Nov 2019 20:08    TPro  7.6    /  Alb  3.7    /  TBili  0.2    /  DBili  x      /  AST  13     /  ALT  13     /  AlkPhos  63     10 Nov 2019 20:08    PT/INR - ( 10 Nov 2019 20:16 )   PT: 13.7 sec;   INR: 1.21          PTT - ( 10 Nov 2019 20:16 )  PTT:30.8 sec    L hip XR  - s/p FRANCINE in place. no fxs evident.

## 2019-11-10 NOTE — ED PROVIDER NOTE - OBJECTIVE STATEMENT
63 yo male with prior left THR, 4/25/19 , with revision 5/22/19 at [facility]. presents with pain tenderness left hip and ? infection in joint x 1 month.  no fevers or chills  no N/V  no sob or cp noted drainage and erythema along left hip incision from prior surgery? drainage earlier today - no calf tenderness - no sob/ no cp

## 2019-11-10 NOTE — ED ADULT NURSE NOTE - OBJECTIVE STATEMENT
Pt. w/ Hx of HLD, HTN, 2-3 cardiac stents s/p L hip replacement in April, debridement in May, sent to ED for L hip surgery w/ orthopedic surgeon Boaztall. Pt. has been taking IV/PO ABx during the summer, currently started new PO ABx one week ago (does not recall name). Pt. reports wound began draining serosanguineous discharge two days ago, covered with gauze and tape in ED at this time. Denies fever, chills, body aches. Ambulatory in ED w/ crutches. Taking tramadol w/ partial pain relief, last taken this AM. Wife at bedside.

## 2019-11-10 NOTE — ED PROVIDER NOTE - DIAGNOSTIC INTERPRETATION
left hip- 3 views-- no fx  no disloc-  no air in soft tissues    CXR- 2 views - no consolidation- no effusion- nl bones and soft tissues- nl mediastinum

## 2019-11-11 ENCOUNTER — APPOINTMENT (OUTPATIENT)
Dept: ORTHOPEDIC SURGERY | Facility: HOSPITAL | Age: 64
End: 2019-11-11

## 2019-11-11 ENCOUNTER — RESULT REVIEW (OUTPATIENT)
Age: 64
End: 2019-11-11

## 2019-11-11 DIAGNOSIS — Z01.818 ENCOUNTER FOR OTHER PREPROCEDURAL EXAMINATION: ICD-10-CM

## 2019-11-11 DIAGNOSIS — E78.00 PURE HYPERCHOLESTEROLEMIA, UNSPECIFIED: ICD-10-CM

## 2019-11-11 DIAGNOSIS — I10 ESSENTIAL (PRIMARY) HYPERTENSION: ICD-10-CM

## 2019-11-11 DIAGNOSIS — I25.10 ATHEROSCLEROTIC HEART DISEASE OF NATIVE CORONARY ARTERY WITHOUT ANGINA PECTORIS: ICD-10-CM

## 2019-11-11 DIAGNOSIS — M00.9 PYOGENIC ARTHRITIS, UNSPECIFIED: ICD-10-CM

## 2019-11-11 LAB
ANION GAP SERPL CALC-SCNC: 11 MMOL/L — SIGNIFICANT CHANGE UP (ref 5–17)
BLD GP AB SCN SERPL QL: NEGATIVE — SIGNIFICANT CHANGE UP
BUN SERPL-MCNC: 16 MG/DL — SIGNIFICANT CHANGE UP (ref 7–23)
CALCIUM SERPL-MCNC: 9.1 MG/DL — SIGNIFICANT CHANGE UP (ref 8.4–10.5)
CHLORIDE SERPL-SCNC: 102 MMOL/L — SIGNIFICANT CHANGE UP (ref 96–108)
CO2 SERPL-SCNC: 26 MMOL/L — SIGNIFICANT CHANGE UP (ref 22–31)
CREAT SERPL-MCNC: 0.87 MG/DL — SIGNIFICANT CHANGE UP (ref 0.5–1.3)
GLUCOSE SERPL-MCNC: 109 MG/DL — HIGH (ref 70–99)
HCT VFR BLD CALC: 37.6 % — LOW (ref 39–50)
HGB BLD-MCNC: 12 G/DL — LOW (ref 13–17)
MCHC RBC-ENTMCNC: 29.3 PG — SIGNIFICANT CHANGE UP (ref 27–34)
MCHC RBC-ENTMCNC: 31.9 GM/DL — LOW (ref 32–36)
MCV RBC AUTO: 91.9 FL — SIGNIFICANT CHANGE UP (ref 80–100)
NRBC # BLD: 0 /100 WBCS — SIGNIFICANT CHANGE UP (ref 0–0)
PLATELET # BLD AUTO: 359 K/UL — SIGNIFICANT CHANGE UP (ref 150–400)
POTASSIUM SERPL-MCNC: 4 MMOL/L — SIGNIFICANT CHANGE UP (ref 3.5–5.3)
POTASSIUM SERPL-SCNC: 4 MMOL/L — SIGNIFICANT CHANGE UP (ref 3.5–5.3)
RBC # BLD: 4.09 M/UL — LOW (ref 4.2–5.8)
RBC # FLD: 12 % — SIGNIFICANT CHANGE UP (ref 10.3–14.5)
RH IG SCN BLD-IMP: POSITIVE — SIGNIFICANT CHANGE UP
SODIUM SERPL-SCNC: 139 MMOL/L — SIGNIFICANT CHANGE UP (ref 135–145)
WBC # BLD: 11.21 K/UL — HIGH (ref 3.8–10.5)
WBC # FLD AUTO: 11.21 K/UL — HIGH (ref 3.8–10.5)

## 2019-11-11 PROCEDURE — 97607 NEG PRS WND THR NDME<=50SQCM: CPT

## 2019-11-11 PROCEDURE — 72170 X-RAY EXAM OF PELVIS: CPT | Mod: 26

## 2019-11-11 PROCEDURE — 99223 1ST HOSP IP/OBS HIGH 75: CPT | Mod: GC

## 2019-11-11 PROCEDURE — 27134 REVISE HIP JOINT REPLACEMENT: CPT | Mod: LT

## 2019-11-11 RX ORDER — HYDROMORPHONE HYDROCHLORIDE 2 MG/ML
0.5 INJECTION INTRAMUSCULAR; INTRAVENOUS; SUBCUTANEOUS
Refills: 0 | Status: DISCONTINUED | OUTPATIENT
Start: 2019-11-11 | End: 2019-11-12

## 2019-11-11 RX ORDER — CEFAZOLIN SODIUM 1 G
2000 VIAL (EA) INJECTION EVERY 8 HOURS
Refills: 0 | Status: DISCONTINUED | OUTPATIENT
Start: 2019-11-11 | End: 2019-11-12

## 2019-11-11 RX ORDER — ACETAMINOPHEN 500 MG
650 TABLET ORAL EVERY 6 HOURS
Refills: 0 | Status: DISCONTINUED | OUTPATIENT
Start: 2019-11-11 | End: 2019-11-12

## 2019-11-11 RX ORDER — BUPIVACAINE 13.3 MG/ML
20 INJECTION, SUSPENSION, LIPOSOMAL INFILTRATION ONCE
Refills: 0 | Status: DISCONTINUED | OUTPATIENT
Start: 2019-11-11 | End: 2019-11-19

## 2019-11-11 RX ADMIN — OXYCODONE HYDROCHLORIDE 10 MILLIGRAM(S): 5 TABLET ORAL at 10:40

## 2019-11-11 RX ADMIN — CARVEDILOL PHOSPHATE 12.5 MILLIGRAM(S): 80 CAPSULE, EXTENDED RELEASE ORAL at 14:24

## 2019-11-11 RX ADMIN — Medication 650 MILLIGRAM(S): at 11:40

## 2019-11-11 RX ADMIN — Medication 650 MILLIGRAM(S): at 10:40

## 2019-11-11 RX ADMIN — OXYCODONE HYDROCHLORIDE 10 MILLIGRAM(S): 5 TABLET ORAL at 07:35

## 2019-11-11 RX ADMIN — OXYCODONE HYDROCHLORIDE 10 MILLIGRAM(S): 5 TABLET ORAL at 11:40

## 2019-11-11 RX ADMIN — OXYCODONE HYDROCHLORIDE 10 MILLIGRAM(S): 5 TABLET ORAL at 02:51

## 2019-11-11 RX ADMIN — OXYCODONE HYDROCHLORIDE 10 MILLIGRAM(S): 5 TABLET ORAL at 06:35

## 2019-11-11 RX ADMIN — OXYCODONE HYDROCHLORIDE 10 MILLIGRAM(S): 5 TABLET ORAL at 02:21

## 2019-11-11 RX ADMIN — HYDROMORPHONE HYDROCHLORIDE 0.5 MILLIGRAM(S): 2 INJECTION INTRAMUSCULAR; INTRAVENOUS; SUBCUTANEOUS at 23:45

## 2019-11-11 NOTE — BRIEF OPERATIVE NOTE - NSICDXBRIEFPROCEDURE_GEN_ALL_CORE_FT
PROCEDURES:  Insertion, antibiotic spacer, joint, hip 11-Nov-2019 22:03:50  Fidel Do  Revision of left total hip arthroplasty by posterior approach 11-Nov-2019 22:03:31  Fidel Do

## 2019-11-11 NOTE — CONSULT NOTE ADULT - SUBJECTIVE AND OBJECTIVE BOX
Patient is a 64y old  Male who presents with a chief complaint of L total hip infection (11 Nov 2019 06:20)      HPI:  64 M history of multiple stents, HTN, HLD w b/l Hip replacements complicated by L hip infection treated w debridement and prolonged period of abx however infection persists after completing antibiotic regimen. Pt states the initial surgery was in April of this year, the infection began shortly after and was  treated w a debridement by the original surgeon at an OSH. after the debridement he was placed on a prolonged course of IV abx and after this finished in october the infection came back as proven by hip aspiration several days ago. After seeing Dr. Mead and sending him updated pictures today 11/10 he was sent in for medical management and for explant and placement of a spacer. Pt does endorse fevers and chills. States he has had purulent drainage from the incision. no numbness, tingling or weakness reported. Otherwise healthy (10 Nov 2019 20:51)      PAST MEDICAL & SURGICAL HISTORY:  Hypertension  Hyperlipidemia      FAMILY HISTORY:      SOCIAL HISTORY:  Smoking Status: [ ] Current, [x] Former, [ ] Never  Pack Years:    MEDICATIONS:  Pulmonary:    Antimicrobials:    Anticoagulants:    Onc:    GI/:    Endocrine:  atorvastatin 80 milliGRAM(s) Oral at bedtime    Cardiac:  carvedilol 12.5 milliGRAM(s) Oral every 12 hours    Other Medications:  HYDROmorphone  Injectable 0.5 milliGRAM(s) IV Push every 4 hours PRN  lactated ringers. 1000 milliLiter(s) IV Continuous <Continuous>  metoclopramide Injectable 10 milliGRAM(s) IV Push every 6 hours PRN  oxyCODONE    IR 10 milliGRAM(s) Oral every 4 hours PRN  oxyCODONE    IR 5 milliGRAM(s) Oral every 4 hours PRN  povidone iodine 10% Swab 1 Application(s) Topical once      Allergies    No Known Allergies    Intolerances        Review of Systems:   •	General: negative  •	Skin/Breast: negative  •	Ophthalmologic: negative  •	ENMT: negative  •	Respiratory and Thorax: negative  •	Cardiovascular: negative  •	Gastrointestinal: negative  •	Genitourinary: negative  •	Musculoskeletal: pain and drainage from the left hip  •	Neurological: negative  •	Psychiatric: negative  •	Hematology/Lymphatics: negative  •	Endocrine: negative  •	Allergic/Immunologic: negative    Physical Exam:   • Constitutional:	Well-developed, well nourished  • Eyes:	EOMI; PERRL; no drainage or redness  • ENMT:	No oral lesions; no gross abnormalities  • Neck	No bruits; no thyromegaly or nodules  • Breasts:	not examined  • Back:	No deformity or limitation of movement  • Respiratory:	Breath Sounds equal & clear to percussion & auscultation, no accessory muscle use  • Cardiovascular:	Regular rate & rhythm, normal S1, S2; no murmurs, gallops or rubs; no S3, S4  • Gastrointestinal:	Soft, non-tender, no hepatosplenomegaly, normal bowel sounds  • Genitourinary:	not examined  • Rectal: not examined  • Extremities:	No cyanosis, clubbing or edema  • Vascular:	Equal and normal pulses (carotid, femoral, dorsalis pedis)  • Neurologica:l	not examined  • Skin:	No lesions; no rash  • Lymph Nodes:	No lymphadedenopathy  • Musculoskeletal:	No joint pain, swelling or deformity; no limitation of movement      Vital Signs Last 24 Hrs  T(C): 36.7 (11 Nov 2019 08:10), Max: 37.1 (11 Nov 2019 05:01)  T(F): 98.1 (11 Nov 2019 08:10), Max: 98.7 (11 Nov 2019 05:01)  HR: 65 (11 Nov 2019 08:10) (65 - 81)  BP: 130/75 (11 Nov 2019 08:10) (110/69 - 153/81)  BP(mean): --  RR: 17 (11 Nov 2019 08:10) (16 - 18)  SpO2: 95% (11 Nov 2019 08:10) (95% - 97%)    11-10 @ 07:01  -  11-11 @ 07:00  --------------------------------------------------------  IN: 640 mL / OUT: 355 mL / NET: 285 mL          LABS:      CBC Full  -  ( 11 Nov 2019 06:05 )  WBC Count : 11.21 K/uL  RBC Count : 4.09 M/uL  Hemoglobin : 12.0 g/dL  Hematocrit : 37.6 %  Platelet Count - Automated : 359 K/uL  Mean Cell Volume : 91.9 fl  Mean Cell Hemoglobin : 29.3 pg  Mean Cell Hemoglobin Concentration : 31.9 gm/dL  Auto Neutrophil # : x  Auto Lymphocyte # : x  Auto Monocyte # : x  Auto Eosinophil # : x  Auto Basophil # : x  Auto Neutrophil % : x  Auto Lymphocyte % : x  Auto Monocyte % : x  Auto Eosinophil % : x  Auto Basophil % : x    11-11    139  |  102  |  16  ----------------------------<  109<H>  4.0   |  26  |  0.87    Ca    9.1      11 Nov 2019 06:05    TPro  7.6  /  Alb  3.7  /  TBili  0.2  /  DBili  x   /  AST  13  /  ALT  13  /  AlkPhos  63  11-10    PT/INR - ( 10 Nov 2019 20:16 )   PT: 13.7 sec;   INR: 1.21          PTT - ( 10 Nov 2019 20:16 )  PTT:30.8 sec      Urinalysis Basic - ( 10 Nov 2019 20:19 )    Color: Yellow / Appearance: Clear / SG: >=1.030 / pH: x  Gluc: x / Ketone: NEGATIVE  / Bili: Negative / Urobili: 0.2 E.U./dL   Blood: x / Protein: NEGATIVE mg/dL / Nitrite: NEGATIVE   Leuk Esterase: NEGATIVE / RBC: x / WBC x   Sq Epi: x / Non Sq Epi: x / Bacteria: x    CXR clear  EKG old anteroseptal MI  Culture - Body Fluid with Gram Stain (10.31.19 @ 16:46)    Gram Stain:   Moderate Gram positive cocci in pairs and clusters  Numerous white blood cells    -  Cefazolin: S <=4    -  Clindamycin: S 0.5    -  Erythromycin: R >4    -  Linezolid: S 4    -  Oxacillin: S <=0.25    -  Penicillin: R <=0.03    -  RIF- Rifampin: S <=1 Should not be used as monotherapy    -  Trimethoprim/Sulfamethoxazole: S <=0.5/9.5    -  Vancomycin: S 2    Specimen Source: .Body Fluid left hip trochanteric bursal fluid    Culture Results:   Numerous Staphylococcus aureus    Organism Identification: Staphylococcus aureus    Organism: Staphylococcus aureus    Method Type: FERNANDO                RADIOLOGY & ADDITIONAL STUDIES (The following images were personally reviewed):

## 2019-11-11 NOTE — PROGRESS NOTE ADULT - SUBJECTIVE AND OBJECTIVE BOX
SUBJECTIVE: Patient seen and examined. afebrile    OBJECTIVE:  NAD  Vital Signs Last 24 Hrs  T(C): 37.1 (11 Nov 2019 05:01), Max: 37.1 (11 Nov 2019 05:01)  T(F): 98.7 (11 Nov 2019 05:01), Max: 98.7 (11 Nov 2019 05:01)  HR: 72 (11 Nov 2019 05:01) (72 - 81)  BP: 112/64 (11 Nov 2019 05:01) (112/64 - 153/81)  BP(mean): --  RR: 17 (11 Nov 2019 05:01) (16 - 18)  SpO2: 97% (11 Nov 2019 05:01) (95% - 97%)    	LLE:   	wound w active purulent drainage. erythema noted  	SILT over distal limb and symmetric to contralateral side  	cap refill < 2 seconds  intact EHL/FHL/GS/TA                        12.7   12.83 )-----------( 400      ( 10 Nov 2019 20:08 )             40.0     11-10    140  |  104  |  18  ----------------------------<  102<H>  4.1   |  23  |  0.90    Ca    9.4      10 Nov 2019 20:08    TPro  7.6  /  Alb  3.7  /  TBili  0.2  /  DBili  x   /  AST  13  /  ALT  13  /  AlkPhos  63  11-10    I&O's Detail    10 Nov 2019 07:01  -  11 Nov 2019 06:20  --------------------------------------------------------  IN:    lactated ringers.: 640 mL  Total IN: 640 mL    OUT:    Voided: 355 mL  Total OUT: 355 mL    Total NET: 285 mL        A/P :  Pt is a 63yo Male with left hip FRANCINE PJI  -      NPO  -     Pain control  -    DVT ppx: SCD     -    Weight bearing status: WBAT  -    Dispo: OR today    Ritesh Huitron MD  Senior Orthopaedic Resident  Orthopaedic Surgery

## 2019-11-11 NOTE — CONSULT NOTE ADULT - PROBLEM SELECTOR RECOMMENDATION 3
the previous culture was staph.  he is to be started and antibiotic and follow on OR cultures.  he was on IV antibiotics and oral forms after.  The hardware will be removed

## 2019-11-11 NOTE — PROGRESS NOTE ADULT - SUBJECTIVE AND OBJECTIVE BOX
Ortho Note    Pt with infected left FRANCINE for OR Today with Dr Mead for explant/abs spacer    Patient seen and examined at bedside  Patient comfortable without complaints, pain controlled  Denies CP, SOB, N/V, numbness/tingling   NPO since midnight       Vital Signs Last 24 Hrs  T(C): 36.7 (11-11-19 @ 08:10), Max: 36.7 (11-11-19 @ 08:10)  T(F): 98.1 (11-11-19 @ 08:10), Max: 98.1 (11-11-19 @ 08:10)  HR: 61 (11-11-19 @ 11:47) (61 - 67)  BP: 114/74 (11-11-19 @ 11:47) (110/69 - 130/75)  BP(mean): --  RR: 17 (11-11-19 @ 08:10) (16 - 17)  SpO2: 95% (11-11-19 @ 08:10) (95% - 95%)  AVSS    General: Pt Alert and oriented, NAD  Dressing: left hip gauze + tegederm with saturation   Pulses: 2+ DP pulse LLE   Sensation: intact to light touch LLE   Motor: EHL/FHL/TA/GS 5/5 LLE                           12.0   11.21 )-----------( 359      ( 11 Nov 2019 06:05 )             37.6   11 Nov 2019 06:05    139    |  102    |  16     ----------------------------<  109    4.0     |  26     |  0.87       TPro  7.6    /  Alb  3.7    /  TBili  0.2    /  DBili  x      /  AST  13     /  ALT  13     /  AlkPhos  63     10 Nov 2019 20:08      A/P: 64yMale with infected left FRANCINE for OR today for explant total hip and implant abx spacer     - Medically optimized by Dr Montoya   - Pain Control: continue PRN   - DVT ppx: holding chemical in preop setting, mechanical with SCDs   - NPO since midnight  - consent previously obtained     Ortho Pager [phone]

## 2019-11-11 NOTE — CONSULT NOTE ADULT - PROBLEM SELECTOR RECOMMENDATION 5
The patient's medical condition is optimized for surgery.  There is no contraindication for surgery.  There is no clinical evidence neither of angina, decompensated CHF, arrhthymias, nor valvular disease.   There is no limitation of exercise capacity.  MET is 5 .  ASA class is3 .  Nicholas cardiac risk factor is low .  DVT prophylaxis is indicated.  Pain control.  Early mobilization.  Avoid fluid overload.

## 2019-11-11 NOTE — BRIEF OPERATIVE NOTE - OPERATION/FINDINGS
Infected left FRANCINE prosthesis with subsequent explant and revision with spacer placement    See dictation

## 2019-11-12 ENCOUNTER — TRANSCRIPTION ENCOUNTER (OUTPATIENT)
Age: 64
End: 2019-11-12

## 2019-11-12 LAB
ANION GAP SERPL CALC-SCNC: 14 MMOL/L — SIGNIFICANT CHANGE UP (ref 5–17)
ANION GAP SERPL CALC-SCNC: 9 MMOL/L — SIGNIFICANT CHANGE UP (ref 5–17)
BUN SERPL-MCNC: 21 MG/DL — SIGNIFICANT CHANGE UP (ref 7–23)
BUN SERPL-MCNC: 22 MG/DL — SIGNIFICANT CHANGE UP (ref 7–23)
CALCIUM SERPL-MCNC: 8.3 MG/DL — LOW (ref 8.4–10.5)
CALCIUM SERPL-MCNC: 8.6 MG/DL — SIGNIFICANT CHANGE UP (ref 8.4–10.5)
CHLORIDE SERPL-SCNC: 101 MMOL/L — SIGNIFICANT CHANGE UP (ref 96–108)
CHLORIDE SERPL-SCNC: 103 MMOL/L — SIGNIFICANT CHANGE UP (ref 96–108)
CO2 SERPL-SCNC: 20 MMOL/L — LOW (ref 22–31)
CO2 SERPL-SCNC: 24 MMOL/L — SIGNIFICANT CHANGE UP (ref 22–31)
CREAT SERPL-MCNC: 1.17 MG/DL — SIGNIFICANT CHANGE UP (ref 0.5–1.3)
CREAT SERPL-MCNC: 1.24 MG/DL — SIGNIFICANT CHANGE UP (ref 0.5–1.3)
GLUCOSE SERPL-MCNC: 161 MG/DL — HIGH (ref 70–99)
GLUCOSE SERPL-MCNC: 242 MG/DL — HIGH (ref 70–99)
GRAM STN FLD: SIGNIFICANT CHANGE UP
HCT VFR BLD CALC: 25.2 % — LOW (ref 39–50)
HCT VFR BLD CALC: 27.7 % — LOW (ref 39–50)
HGB BLD-MCNC: 8.5 G/DL — LOW (ref 13–17)
HGB BLD-MCNC: 8.7 G/DL — LOW (ref 13–17)
MCHC RBC-ENTMCNC: 29.3 PG — SIGNIFICANT CHANGE UP (ref 27–34)
MCHC RBC-ENTMCNC: 29.8 PG — SIGNIFICANT CHANGE UP (ref 27–34)
MCHC RBC-ENTMCNC: 31.4 GM/DL — LOW (ref 32–36)
MCHC RBC-ENTMCNC: 33.7 GM/DL — SIGNIFICANT CHANGE UP (ref 32–36)
MCV RBC AUTO: 88.4 FL — SIGNIFICANT CHANGE UP (ref 80–100)
MCV RBC AUTO: 93.3 FL — SIGNIFICANT CHANGE UP (ref 80–100)
NRBC # BLD: 0 /100 WBCS — SIGNIFICANT CHANGE UP (ref 0–0)
NRBC # BLD: 0 /100 WBCS — SIGNIFICANT CHANGE UP (ref 0–0)
PLATELET # BLD AUTO: 340 K/UL — SIGNIFICANT CHANGE UP (ref 150–400)
PLATELET # BLD AUTO: 440 K/UL — HIGH (ref 150–400)
POTASSIUM SERPL-MCNC: 4.8 MMOL/L — SIGNIFICANT CHANGE UP (ref 3.5–5.3)
POTASSIUM SERPL-MCNC: 5.1 MMOL/L — SIGNIFICANT CHANGE UP (ref 3.5–5.3)
POTASSIUM SERPL-SCNC: 4.8 MMOL/L — SIGNIFICANT CHANGE UP (ref 3.5–5.3)
POTASSIUM SERPL-SCNC: 5.1 MMOL/L — SIGNIFICANT CHANGE UP (ref 3.5–5.3)
RBC # BLD: 2.85 M/UL — LOW (ref 4.2–5.8)
RBC # BLD: 2.97 M/UL — LOW (ref 4.2–5.8)
RBC # FLD: 11.9 % — SIGNIFICANT CHANGE UP (ref 10.3–14.5)
RBC # FLD: 11.9 % — SIGNIFICANT CHANGE UP (ref 10.3–14.5)
SODIUM SERPL-SCNC: 134 MMOL/L — LOW (ref 135–145)
SODIUM SERPL-SCNC: 137 MMOL/L — SIGNIFICANT CHANGE UP (ref 135–145)
SPECIMEN SOURCE: SIGNIFICANT CHANGE UP
WBC # BLD: 17.04 K/UL — HIGH (ref 3.8–10.5)
WBC # BLD: 20.54 K/UL — HIGH (ref 3.8–10.5)
WBC # FLD AUTO: 17.04 K/UL — HIGH (ref 3.8–10.5)
WBC # FLD AUTO: 20.54 K/UL — HIGH (ref 3.8–10.5)

## 2019-11-12 PROCEDURE — 99222 1ST HOSP IP/OBS MODERATE 55: CPT

## 2019-11-12 PROCEDURE — 93971 EXTREMITY STUDY: CPT | Mod: 26,LT

## 2019-11-12 PROCEDURE — 99232 SBSQ HOSP IP/OBS MODERATE 35: CPT | Mod: GC

## 2019-11-12 RX ORDER — ASPIRIN/CALCIUM CARB/MAGNESIUM 324 MG
81 TABLET ORAL
Refills: 0 | Status: DISCONTINUED | OUTPATIENT
Start: 2019-11-12 | End: 2019-11-13

## 2019-11-12 RX ORDER — ENOXAPARIN SODIUM 100 MG/ML
100 INJECTION SUBCUTANEOUS EVERY 24 HOURS
Refills: 0 | Status: DISCONTINUED | OUTPATIENT
Start: 2019-11-12 | End: 2019-11-13

## 2019-11-12 RX ORDER — NAFCILLIN 10 G/100ML
2 INJECTION, POWDER, FOR SOLUTION INTRAVENOUS EVERY 4 HOURS
Refills: 0 | Status: DISCONTINUED | OUTPATIENT
Start: 2019-11-12 | End: 2019-11-19

## 2019-11-12 RX ORDER — ACETAMINOPHEN 500 MG
650 TABLET ORAL EVERY 6 HOURS
Refills: 0 | Status: DISCONTINUED | OUTPATIENT
Start: 2019-11-12 | End: 2019-11-19

## 2019-11-12 RX ORDER — FERROUS SULFATE 325(65) MG
325 TABLET ORAL THREE TIMES A DAY
Refills: 0 | Status: DISCONTINUED | OUTPATIENT
Start: 2019-11-12 | End: 2019-11-19

## 2019-11-12 RX ADMIN — Medication 81 MILLIGRAM(S): at 17:39

## 2019-11-12 RX ADMIN — Medication 325 MILLIGRAM(S): at 21:29

## 2019-11-12 RX ADMIN — Medication 81 MILLIGRAM(S): at 06:50

## 2019-11-12 RX ADMIN — OXYCODONE HYDROCHLORIDE 10 MILLIGRAM(S): 5 TABLET ORAL at 21:44

## 2019-11-12 RX ADMIN — NAFCILLIN 200 GRAM(S): 10 INJECTION, POWDER, FOR SOLUTION INTRAVENOUS at 21:29

## 2019-11-12 RX ADMIN — HYDROMORPHONE HYDROCHLORIDE 0.5 MILLIGRAM(S): 2 INJECTION INTRAMUSCULAR; INTRAVENOUS; SUBCUTANEOUS at 00:33

## 2019-11-12 RX ADMIN — CARVEDILOL PHOSPHATE 12.5 MILLIGRAM(S): 80 CAPSULE, EXTENDED RELEASE ORAL at 17:39

## 2019-11-12 RX ADMIN — OXYCODONE HYDROCHLORIDE 10 MILLIGRAM(S): 5 TABLET ORAL at 16:28

## 2019-11-12 RX ADMIN — OXYCODONE HYDROCHLORIDE 10 MILLIGRAM(S): 5 TABLET ORAL at 20:44

## 2019-11-12 RX ADMIN — OXYCODONE HYDROCHLORIDE 10 MILLIGRAM(S): 5 TABLET ORAL at 10:52

## 2019-11-12 RX ADMIN — OXYCODONE HYDROCHLORIDE 10 MILLIGRAM(S): 5 TABLET ORAL at 03:59

## 2019-11-12 RX ADMIN — OXYCODONE HYDROCHLORIDE 10 MILLIGRAM(S): 5 TABLET ORAL at 09:52

## 2019-11-12 RX ADMIN — ATORVASTATIN CALCIUM 80 MILLIGRAM(S): 80 TABLET, FILM COATED ORAL at 21:29

## 2019-11-12 RX ADMIN — Medication 2000 MILLIGRAM(S): at 14:29

## 2019-11-12 RX ADMIN — Medication 650 MILLIGRAM(S): at 18:39

## 2019-11-12 RX ADMIN — Medication 650 MILLIGRAM(S): at 17:38

## 2019-11-12 RX ADMIN — Medication 650 MILLIGRAM(S): at 12:13

## 2019-11-12 RX ADMIN — OXYCODONE HYDROCHLORIDE 10 MILLIGRAM(S): 5 TABLET ORAL at 17:28

## 2019-11-12 RX ADMIN — HYDROMORPHONE HYDROCHLORIDE 0.5 MILLIGRAM(S): 2 INJECTION INTRAMUSCULAR; INTRAVENOUS; SUBCUTANEOUS at 23:45

## 2019-11-12 RX ADMIN — Medication 650 MILLIGRAM(S): at 11:13

## 2019-11-12 RX ADMIN — ATORVASTATIN CALCIUM 80 MILLIGRAM(S): 80 TABLET, FILM COATED ORAL at 00:33

## 2019-11-12 RX ADMIN — OXYCODONE HYDROCHLORIDE 10 MILLIGRAM(S): 5 TABLET ORAL at 04:59

## 2019-11-12 RX ADMIN — Medication 2000 MILLIGRAM(S): at 05:15

## 2019-11-12 NOTE — DISCHARGE NOTE PROVIDER - NSDCFUADDAPPT_GEN_ALL_CORE_FT
RN home visits to assess surgical site. Review and teach patient and caregiver home IV antibiotic therapy.

## 2019-11-12 NOTE — DISCHARGE NOTE PROVIDER - NSDCCPTREATMENT_GEN_ALL_CORE_FT
PRINCIPAL PROCEDURE  Procedure: Revision of left total hip arthroplasty by posterior approach  Findings and Treatment:       SECONDARY PROCEDURE  Procedure: Insertion, antibiotic spacer, joint, hip  Findings and Treatment:

## 2019-11-12 NOTE — DISCHARGE NOTE PROVIDER - NSDCFUSCHEDAPPT_GEN_ALL_CORE_FT
MERYL PIZARRO ; 11/13/2019 ; NPP [facility] [address]  MERYL PIZARRO ; 11/21/2019 ; [facility] PreAdmits  MERYL PIZARRO ; 11/21/2019 ; [facility]IN[facility]A [facility] [address] MERYL PIZARRO ; 11/13/2019 ; NPP [facility] [address]  MERYL PIZARRO ; 11/21/2019 ; [facility] PreAdmits  MERYL PIZARRO ; 11/21/2019 ; [facility] [address] MERYL PIZARRO ; 11/13/2019 ; NPP [facility] [address]  MERYL PIZARRO ; 11/21/2019 ; [facility] PreAdmits  MERYL PIZARRO ; 11/21/2019 ; [facility] Orthos[facility] [address] MERYL PIZARRO ; 11/13/2019 ; NPP [facility]asc [address]  MERYL PIZARRO ; 11/21/2019 ; [facility] PreAdmits  MERYL PIZARRO ; 11/21/2019 ; [facility] [address] MERYL PIZARRO ; 11/21/2019 ; [facility] PreAdmits  MERYL PIZARRO ; 12/20/2019 ; [facility] Med  [address] MERYL PIZARRO ; 11/21/2019 ; [facility] PreAdmits  MERYL PIZARRO ; 11/26/2019 ; NPP OrthoSurg [address] [place] [address]  MERYL PIZARRO ; 12/03/2019 ; NPP OrthoSurg [address]   MERYL PIZARRO ; 12/20/2019 ; NPP Med  [address] MERYL PIZARRO ; 11/21/2019 ; [facility] PreAdmits  MERYL PIZARRO ; 12/03/2019 ; NPP OrthoSurg [address]   MERYL PIZARRO ; 12/20/2019 ; NPP Med  [address] MERYL PIZARRO ; 11/21/2019 ; [facility] PreAdmits  MERYL PIZARRO ; 12/03/2019 ; NPP OrthoSurg [address]   MERYL PIZARRO ; 12/20/2019 ; NPP Med  [address]

## 2019-11-12 NOTE — DIETITIAN INITIAL EVALUATION ADULT. - OTHER INFO
64yMale hx HTN, HLD, now s/p left hip explant/ spacer (anterolateral approach) on 11/11. Post-op anemia, s/p 1 unit PRBCs. Pt resting in bed. Pt denies N/V, last BM 11/10. +pain, improved with pain medication. Pt consuming >75% of meals at this time. Pt reports fluctuating appetite PTA while on IV abx. Pt reports following healthy diet, limits salt in diet, denies food allergies. Weight stable PTA around 240lb. Encouraged adequate PO intake with pt, reviewed DASH diet with pt. Pt appeared receptive to education/recommendation. Please see below for full nutritional recommendations- d/w team. RD to monitor and f/u per protocol.

## 2019-11-12 NOTE — OCCUPATIONAL THERAPY INITIAL EVALUATION ADULT - GENERAL OBSERVATIONS, REHAB EVAL
Patient right hand dominant. Chart reviewed, patient pending LE Ultrasound however cleared to be seen by NP Amilcar, and KIARRA Moralez cleared patient for OT evaluation. Patient received supine in bed, NAD, +IV heplock, +SCDs, +prevena, +hemovac.

## 2019-11-12 NOTE — DISCHARGE NOTE PROVIDER - NSDCHHNEEDSERVICE_GEN_ALL_CORE
Teaching and training/Wound care and assessment/Medication teaching and assessment/Observation and assessment/Central venous access care/Rehabilitation services

## 2019-11-12 NOTE — DISCHARGE NOTE PROVIDER - INSTRUCTIONS
Date/Time of Note


Date/Time of Note


DATE: 2/10/17 


TIME: 16:35





Assessment/Plan


Assessment/Plan


Chief Complaint/Hosp Course


IMPRESSION:


1.  The patient has acute hemorrhagic stroke./sdh/trauma


2.  Malignant hypertension.better


3.  Endstage renal disease.


4.  Leukocytosis. better


5.  Respiratory failure.


6.  Incomplete database


7 hyperkalemia hx


8 SUBCUTANEOUS EPMHYSEMA/c tube


9 hypernatremia


10 hyperphosphatemia


plan hd


per neuro


labs


per surgery


avoid hyponatremia


continue hd


Problems:  





Consultation Date/Type/Reason


Admit Date/Time


Jan 31, 2017 at 13:31


Initial Consult Date


1/31/17


Type of Consultation:  renal





24 HR Interval Summary


Constitutional:  no complaints





Exam/Review of Systems


Vital Signs


Vitals





 Vital Signs








  Date Time  Temp Pulse Resp B/P Pulse Ox O2 Delivery O2 Flow Rate FiO2


 


2/10/17 16:09  117      


 


2/10/17 15:47 98.7  19 132/90 95   


 


2/10/17 08:29        21


 


2/10/17 02:00      Room Air  


 


2/8/17 18:00       2.0 














 Intake and Output   


 


 2/9/17 2/9/17 2/10/17





 15:00 23:00 07:00


 


Intake Total  800 ml 


 


Output Total  800 ml 


 


Balance  0 ml 











Exam


Neck:  supple


Respiratory:  clear to auscultation


Cardiovascular:  regular rate and rhythm


Gastrointestinal:  bowel sounds (+), soft


Extremities:  edema (tr)





Results


Result Diagram:  


2/10/17 0640                                                                   

             2/10/17 0640





Results 24 hrs





Laboratory Tests








Test


  2/10/17


06:40


 


Anion Gap 30  H


 


Basophils # 0.0  


 


Basophils % 0.1  


 


Blood Urea Nitrogen 69  #H


 


Calcium Level 10.1  


 


Carbon Dioxide Level 26  


 


Chloride Level 87  #L


 


Creatinine 6.55  #H


 


Eosinophils # 0.1  


 


Eosinophils % 0.7  


 


Glucose Level 181  


 


Hematocrit 43.4  


 


Hemoglobin 13.9  


 


Lymphocytes # 0.9  


 


Lymphocytes % 6.1  L


 


Magnesium Level 2.5  


 


Mean Corpuscular Hemoglobin 28.2  L


 


Mean Corpuscular Hemoglobin


Concent 32.0  


 


 


Mean Corpuscular Volume 88.0  


 


Mean Platelet Volume 11.4  H


 


Monocytes # 0.8  


 


Monocytes % 5.0  


 


Neutrophils # 13.0  H


 


Neutrophils % 87.0  H


 


Nucleated Red Blood Cells # 0.0  


 


Nucleated Red Blood Cells % 0.0  


 


Phosphorus Level 4.1  


 


Platelet Count 260  


 


Potassium Level 3.6  


 


Red Blood Count 4.93  


 


Red Cell Distribution Width 14.6  H


 


Sodium Level 139  


 


White Blood Count 14.9  #H











Medications


Medications





 Current Medications


Labetalol HCl (Labetalol) 10 mg Q10M  PRN IV ELEVATED BLOOD PRESSURE Last 

administered on 2/9/17at 12:05; Admin Dose 10 MG;  Start 1/31/17 at 14:00


Ondansetron HCl (Zofran Inj) 4 mg Q6H  PRN IV NAUSEA AND/OR VOMITING Last 

administered on 1/31/17at 20:32; Admin Dose 4 MG;  Start 1/31/17 at 14:00


Acetaminophen (Tylenol Liquid) 650 mg Q6H  PRN PO PAIN LEVEL 1-3 OR FEVER Last 

administered on 2/9/17at 14:25; Admin Dose 650 MG;  Start 1/31/17 at 14:00


Acetaminophen (Tylenol Supp) 650 mg Q4H  PRN NV PAIN LEVEL 1-3 OR FEVER;  Start 

1/31/17 at 14:00


Eye Lubricant (Artificial Tears Oph) 1 drop TID BOTH EYES  Last administered on 

2/10/17at 14:09; Admin Dose 1 DROP;  Start 1/31/17 at 21:00


Multivit/Ca Carb/ B Cmplx/FA/Prenat (Charlotte-Argenis) 1 tab DAILY PO  Last 

administered on 2/10/17at 09:51; Admin Dose 1 TAB;  Start 2/1/17 at 09:00


Hydromorphone HCl (Dilaudid) 0.5 mg Q4H  PRN IV PAIN Last administered on 2/9/ 17at 16:15; Admin Dose 0.5 MG;  Start 1/31/17 at 21:30


Atorvastatin Calcium 20 mg 20 mg QHS NGT  Last administered on 2/9/17at 21:17; 

Admin Dose 20 MG;  Start 2/6/17 at 21:00


Levetiracetam/ Dextrose (Keppra Iv/D5W) 105 ml @  420 mls/hr DAILY IVPB  Last 

administered on 2/10/17at 09:52; Admin Dose 420 MLS/HR;  Start 2/7/17 at 20:00


Cyclobenzaprine HCl (Flexeril) 5 mg Q8  PRN PO MUSCLE SPASM  Last administered 

on 2/8/17at 10:27; Admin Dose 5 MG;  Start 2/8/17 at 10:24


Famotidine (Pepcid) 20 mg DAILY NGT  Last administered on 2/10/17at 09:51; 

Admin Dose 20 MG;  Start 2/10/17 at 09:00


Sevelamer Carbonate 2.4 gm 2.4 gm Q8 PO  Last administered on 2/10/17at 14:06; 

Admin Dose 2.4 GM;  Start 2/9/17 at 22:00


Piperacillin Sod/ Tazobactam Sod (Zosyn 2.25gm/ 50ml (Pmx)) 50 ml @  100 mls/hr 

Q12 IVPB  Last administered on 2/10/17at 15:10; Admin Dose 100 MLS/HR;  Start 2/

10/17 at 14:00











PRITESH MACKEY MD Feb 10, 2017 16:35 as tolerated

## 2019-11-12 NOTE — PROGRESS NOTE ADULT - SUBJECTIVE AND OBJECTIVE BOX
Ortho Post Op Check    Procedure: L FRANCINE periprosthetic infection with explant and application of antibiotic spacer  Surgeon: Alyce    Pt comfortable but somewhat drowsy; pain controlled  Complaining of numbness over plantar surface of L foot and inability to dorsiflex @ L ankle and L toes.  Denies CP, SOB, N/V, numbness/tingling     Vital Signs Last 24 Hrs  T(C): 36.2 (11-11-19 @ 22:24), Max: 36.2 (11-11-19 @ 22:24)  T(F): 97.2 (11-11-19 @ 22:24), Max: 97.2 (11-11-19 @ 22:24)  HR: 94 (11-12-19 @ 00:04) (76 - 96)  BP: 99/55 (11-12-19 @ 00:04) (72/38 - 115/51)  BP(mean): 71 (11-12-19 @ 00:04) (46 - 72)  RR: 18 (11-12-19 @ 00:04) (15 - 20)  SpO2: 98% (11-12-19 @ 00:04) (95% - 99%)    Pt occasionally hypotensive; Otherwise VSS  General: Pt Alert and oriented, NAD  LLE: Prevena DSG C/D/I holding suction  Pulses: DP pulse 2+; Cap refill < 2 sec  Sensation: SILT and symmetric to contralateral extremity  Motor: EHL/FHL/TA/GS 5/5 and symmetric to contralateral extremity                          8.7    20.54 )-----------( 440      ( 11 Nov 2019 23:24 )             27.7     11 Nov 2019 23:24    137    |  103    |  21     ----------------------------<  242    5.1     |  20     |  1.24     Ca    8.6        11 Nov 2019 23:24    TPro  7.6    /  Alb  3.7    /  TBili  0.2    /  DBili  x      /  AST  13     /  ALT  13     /  AlkPhos  63     10 Nov 2019 20:08      Post-op X-Ray: Patient now s/p antiobiotic spacer placement Ortho Post Op Check    Procedure: L FRANCINE periprosthetic infection with explant and application of antibiotic spacer  Surgeon: Alyce    Pt comfortable but somewhat drowsy; pain controlled  Complaining of numbness over plantar surface of L foot and inability to dorsiflex @ L ankle and L toes.  Denies CP, SOB, N/V, numbness/tingling     Vital Signs Last 24 Hrs  T(C): 36.2 (11-11-19 @ 22:24), Max: 36.2 (11-11-19 @ 22:24)  T(F): 97.2 (11-11-19 @ 22:24), Max: 97.2 (11-11-19 @ 22:24)  HR: 94 (11-12-19 @ 00:04) (76 - 96)  BP: 99/55 (11-12-19 @ 00:04) (72/38 - 115/51)  BP(mean): 71 (11-12-19 @ 00:04) (46 - 72)  RR: 18 (11-12-19 @ 00:04) (15 - 20)  SpO2: 98% (11-12-19 @ 00:04) (95% - 99%)    Pt occasionally hypotensive; Otherwise VSS  General: Pt Alert and oriented, NAD; HV x1 holding suction  LLE: Prevena DSG C/D/I holding suction  Pulses: DP pulse 2+; Cap refill < 2 sec  Sensation: SILT and symmetric to contralateral extremity  Motor: EHL/FHL/TA/GS 5/5 and symmetric to contralateral extremity                          8.7    20.54 )-----------( 440      ( 11 Nov 2019 23:24 )             27.7     11 Nov 2019 23:24    137    |  103    |  21     ----------------------------<  242    5.1     |  20     |  1.24     Ca    8.6        11 Nov 2019 23:24    TPro  7.6    /  Alb  3.7    /  TBili  0.2    /  DBili  x      /  AST  13     /  ALT  13     /  AlkPhos  63     10 Nov 2019 20:08      Post-op X-Ray: Patient now s/p antiobiotic spacer placement Ortho Post Op Check    Procedure: L FRANCINE periprosthetic infection with explant and application of antibiotic spacer  Surgeon: Alyce    Pt comfortable but somewhat drowsy; pain controlled  Complaining of numbness over plantar surface of L foot and inability to dorsiflex @ L ankle and L toes.  Denies CP, SOB, N/V, numbness/tingling     Vital Signs Last 24 Hrs  T(C): 36.2 (11-11-19 @ 22:24), Max: 36.2 (11-11-19 @ 22:24)  T(F): 97.2 (11-11-19 @ 22:24), Max: 97.2 (11-11-19 @ 22:24)  HR: 94 (11-12-19 @ 00:04) (76 - 96)  BP: 99/55 (11-12-19 @ 00:04) (72/38 - 115/51)  BP(mean): 71 (11-12-19 @ 00:04) (46 - 72)  RR: 18 (11-12-19 @ 00:04) (15 - 20)  SpO2: 98% (11-12-19 @ 00:04) (95% - 99%)    Pt occasionally hypotensive; Otherwise VSS  General: Pt Alert and oriented, NAD; HV x1 holding suction  LLE: Prevena DSG C/D/I holding suction  Pulses: DP pulse 2+; Cap refill < 2 sec  Sensation: Diminished sensation over plantar and dorsal surfaces of the foot, otherwise SILT and symmetric to contralateral extremity  Motor: FHL/GS 5/5 and symmetric to contralateral extremity; EHL/TA 0/5                          8.7    20.54 )-----------( 440      ( 11 Nov 2019 23:24 )             27.7     11 Nov 2019 23:24    137    |  103    |  21     ----------------------------<  242    5.1     |  20     |  1.24     Ca    8.6        11 Nov 2019 23:24    TPro  7.6    /  Alb  3.7    /  TBili  0.2    /  DBili  x      /  AST  13     /  ALT  13     /  AlkPhos  63     10 Nov 2019 20:08      Post-op X-Ray: Patient now s/p antiobiotic spacer placement

## 2019-11-12 NOTE — OCCUPATIONAL THERAPY INITIAL EVALUATION ADULT - GROSSLY INTACT, SENSORY
Patient reports decreased sensation on L foot and posterior calf/Left LE/Right LE/Left UE/Right UE/Grossly Intact

## 2019-11-12 NOTE — PROGRESS NOTE ADULT - SUBJECTIVE AND OBJECTIVE BOX
Interval Events: Reviewed  Patient seen and examined at bedside.    Patient is a 64y old  Male who presents with a chief complaint of L total hip infection (13 Nov 2019 05:40)  he is having pain in the left hip more than the previous surgeries    PAST MEDICAL & SURGICAL HISTORY:  Hypertension  Hyperlipidemia      MEDICATIONS:  Pulmonary:    Antimicrobials:  nafcillin  IVPB 2 Gram(s) IV Intermittent every 4 hours  rifAMPin 300 milliGRAM(s) Oral every 12 hours    Anticoagulants:  enoxaparin Injectable 110 milliGRAM(s) SubCutaneous every 12 hours    Cardiac:  carvedilol 12.5 milliGRAM(s) Oral every 12 hours      Allergies    No Known Allergies    Intolerances        Vital Signs Last 24 Hrs  T(C): 36.8 (13 Nov 2019 05:05), Max: 37.1 (12 Nov 2019 20:30)  T(F): 98.3 (13 Nov 2019 05:05), Max: 98.7 (12 Nov 2019 20:30)  HR: 82 (13 Nov 2019 05:05) (79 - 120)  BP: 99/57 (13 Nov 2019 05:05) (99/57 - 158/78)  BP(mean): --  RR: 14 (13 Nov 2019 05:05) (14 - 17)  SpO2: 99% (13 Nov 2019 05:05) (95% - 99%)    11-12 @ 07:01  -  11-13 @ 07:00  --------------------------------------------------------  IN: 440 mL / OUT: 910 mL / NET: -470 mL          Review of Systems:   •	General: negative  •	Skin/Breast: negative  •	Ophthalmologic: negative  •	ENMT: negative  •	Respiratory and Thorax: negative  •	Cardiovascular: negative  •	Gastrointestinal: negative  •	Genitourinary: negative  •	Musculoskeletal: negative  •	Neurological: negative  •	Psychiatric: negative  •	Hematology/Lymphatics: negative  •	Endocrine: negative  •	Allergic/Immunologic: negative    Physical Exam:   • Constitutional:	Well-developed, well nourished  • Eyes:	EOMI; PERRL; no drainage or redness  • ENMT:	No oral lesions; no gross abnormalities  • Neck	No bruits; no thyromegaly or nodules  • Breasts:	not examined  • Back:	No deformity or limitation of movement  • Respiratory:	Breath Sounds equal & clear to percussion & auscultation, no accessory muscle use  • Cardiovascular:	Regular rate & rhythm, normal S1, S2; no murmurs, gallops or rubs; no S3, S4  • Gastrointestinal:	Soft, non-tender, no hepatosplenomegaly, normal bowel sounds  • Genitourinary:	not examined  • Rectal: not examined  • Extremities:	No cyanosis, clubbing or edema  • Vascular:	Equal and normal pulses (carotid, femoral, dorsalis pedis)  • Neurologica:l	not examined  • Skin:	No lesions; no rash  • Lymph Nodes:	No lymphadedenopathy  • Musculoskeletal:	No joint pain, swelling or deformity; no limitation of movement        LABS:      CBC Full  -  ( 12 Nov 2019 10:47 )  WBC Count : 17.04 K/uL  RBC Count : 2.85 M/uL  Hemoglobin : 8.5 g/dL  Hematocrit : 25.2 %  Platelet Count - Automated : 340 K/uL  Mean Cell Volume : 88.4 fl  Mean Cell Hemoglobin : 29.8 pg  Mean Cell Hemoglobin Concentration : 33.7 gm/dL  Auto Neutrophil # : x  Auto Lymphocyte # : x  Auto Monocyte # : x  Auto Eosinophil # : x  Auto Basophil # : x  Auto Neutrophil % : x  Auto Lymphocyte % : x  Auto Monocyte % : x  Auto Eosinophil % : x  Auto Basophil % : x    11-12    134<L>  |  101  |  22  ----------------------------<  161<H>  4.8   |  24  |  1.17    Ca    8.3<L>      12 Nov 2019 10:47                      Culture Results:   Testing in progress (11-12 @ 01:12)  Culture Results:   Testing in progress (11-12 @ 01:12)  Culture Results:   Testing in progress (11-12 @ 01:11)  Culture Results:   Rare Staphylococcus aureus  Susceptibility to follow. (11-11 @ 22:15)  Culture Results:   Culture in progress (11-11 @ 22:13)  Culture Results:   Rare Staphylococcus aureus  Susceptibility to follow. (11-11 @ 22:12)  Culture Results:   No growth to date (11-11 @ 08:47)      RADIOLOGY & ADDITIONAL STUDIES (The following images were personally reviewed):  Villa:                                     No  Urine output:                       adequate  DVT prophylaxis:                 Yes  Flattus:                                  Yes  Bowel movement:              No

## 2019-11-12 NOTE — PROGRESS NOTE ADULT - SUBJECTIVE AND OBJECTIVE BOX
Ortho Note    Pt seen and examined. L hip pain controlled. Reports inability to dorsiflex L foot since surgery.  C/o localized L calf pain, which he first noticed in the " last couple of hours".  Denies CP, SOB, N/V, numbness/tingling. Denies fever/ chills/ dizziness.    Vital Signs Last 24 Hrs  T(C): 36.9 (11-12-19 @ 08:15), Max: 36.9 (11-12-19 @ 08:15)  T(F): 98.5 (11-12-19 @ 08:15), Max: 98.5 (11-12-19 @ 08:15)  HR: 90 (11-12-19 @ 08:15) (90 - 90)  BP: 121/73 (11-12-19 @ 08:15) (121/73 - 121/73)  BP(mean): --  RR: 17 (11-12-19 @ 08:15) (17 - 17)  SpO2: 97% (11-12-19 @ 08:15) (97% - 97%)  AVSS    focused exam:  General: Pt Alert and oriented, NAD  DSG- prevena to L hip CDI  Pulses: +2DP, WWP feet  Sensation: SILT bilaterally in LE except reports "duller" sensation dordal L foot compared to R  Motor: 5/5 FHL/GS bilaterally, 0/5 EHL/TA LLE, 5/5 EHL/TA RLE                        8.5    17.04 )-----------( 340      ( 12 Nov 2019 10:47 )             25.2   12 Nov 2019 10:47    134    |  101    |  22     ----------------------------<  161    4.8     |  24     |  1.17     Ca    8.3        12 Nov 2019 10:47    TPro  7.6    /  Alb  3.7    /  TBili  0.2    /  DBili  x      /  AST  13     /  ALT  13     /  AlkPhos  63     10 Nov 2019 20:08      A/P: 64yMale POD#1 s/p left hip explant/ spacer (anterolateral approach)   - post-op anemia, s/p 1 unit PRBCs; H+H 8.5/25.2. Asymptomatic. Monitor AM labs. Appreciate medicine recs.  - Pain Control- continue current regimen, pain well-controlled  - DVT ppx: ASA 81mg bid x 1 month  - PT, WBS: 50% PWB LLE, abductor precuations (no active L hip abduction)  - post-op foot drop- continue L knee flexion, L hip extension  - L calf pain- r/o DVT, LLE doppler ordered  - continue bowel regimen  - appreciate ID recs- blood cultures NGTD, OR cultures in progress; continue Ancef q8h   - dispo: home vs TIFFANY pending progress    Ortho Pager [phone]

## 2019-11-12 NOTE — DISCHARGE NOTE PROVIDER - PROVIDER TOKENS
PROVIDER:[TOKEN:[[id number]]] PROVIDER:[TOKEN:[[id number]]],PROVIDER:[TOKEN:[[id number]],FOLLOWUP:[2 weeks]],PROVIDER:[TOKEN:[[id number]],FOLLOWUP:[2 weeks]]

## 2019-11-12 NOTE — PHYSICAL THERAPY INITIAL EVALUATION ADULT - PHYSICAL ASSIST/NONPHYSICAL ASSIST: SUPINE/SIT, REHAB EVAL
[id number];  Therapeutic Exercise:    15     mins  52925;     Neuromuscular Jasmin:    0    mins  63964;    Therapeutic Activity:     16     mins  49186;     Gait Trainin     mins  54069;     Ultrasound:     0     mins  65273;    Electrical Stimulation:    0     mins  13373 ( );    Untimed:  Electrical Stimulation:    0     mins  51153 ( );  Mechanical Traction:    0     mins  46126;     Timed Treatment:   48   mins   Total Treatment:     48   mins  Luisa Graves, PT  Physical Therapist                  
verbal cues/1 person assist

## 2019-11-12 NOTE — CONSULT NOTE ADULT - ASSESSMENT
63 yo M with HTN, HLD, CAD s/p stents, B THR with PPJI L hip, initial onset early after surgery in 4/2019, failed DAIR/prolonged IV antibiotics, now s/p explantation and spacer placement.  Per pt, initial cultures grew Staph aureus, as did aspirate culture (MSSA) from 10/31 without additional organisms.  Spacer has metal.  Suggest:  - F/U blood cultures from 11/10 and OR cultures - pending  - Nafcillin 2 g IV q4h  - Rifampin 300 mg po q12h  - d/c cefazolin  Recommendations discussed with primary team.  Will follow with you – ID Team 2.

## 2019-11-12 NOTE — DISCHARGE NOTE PROVIDER - NSDCMRMEDTOKEN_GEN_ALL_CORE_FT
atorvastatin 80 mg oral tablet: 1 tab(s) orally once a day  carvedilol 12.5 mg oral tablet: 1 tab(s) orally 2 times a day AFO brace (left): diagnosis: M21.379  atorvastatin 80 mg oral tablet: 1 tab(s) orally once a day  carvedilol 12.5 mg oral tablet: 1 tab(s) orally 2 times a day acetaminophen 325 mg oral tablet: 2 tab(s) orally every 6 hours  AFO brace (left): diagnosis: M21.379  atorvastatin 80 mg oral tablet: 1 tab(s) orally once a day  carvedilol 12.5 mg oral tablet: 1 tab(s) orally 2 times a day  CBC, CMP, ESR, CRP lab draws 1x/ week for 6 weeks: Start date: 11/20/19  Send results to ID Dr. Inessa Schreiber.  Fax: [phone]    ICD10- T84.52XA  heparin 3ml from 5ml syringe: Administer after each infusion  nafcillin 2 g/100 mL intravenous solution: 2 gram(s) intravenous every 4 hours for 6 weeks    Last date of administration: 12/23/19  diagnosis: Left hip infection (staph aureus)  ICD10- T84.52XA  NS flush 10mL: Administer before and after each infusion acetaminophen 325 mg oral tablet: 2 tab(s) orally every 6 hours  AFO brace (left): diagnosis: M21.379  atorvastatin 80 mg oral tablet: 1 tab(s) orally once a day  carvedilol 12.5 mg oral tablet: 1 tab(s) orally 2 times a day  CBC, CMP, ESR, CRP lab draws 1x/ week for 6 weeks: Start date: 11/20/19  Send results to ID Dr. Inessa Schreiber.  Fax: [phone]    ICD10- T84.52XA  heparin 3ml from 5ml syringe: Administer after each infusion  nafcillin 2 g/100 mL intravenous solution: 2 gram(s) intravenous every 4 hours for 6 weeks    Last date of administration: 12/23/19  diagnosis: Left hip infection (staph aureus)  ICD10- T84.52XA  NS flush 10mL: Administer before and after each infusion acetaminophen 325 mg oral tablet: 2 tab(s) orally every 6 hours, as needed for mild natalie. DO NOT EXCEED 4,000mg ACETAMINOPHEN IN 1 DAY  AFO brace (left): diagnosis: M21.379  apixaban 5 mg oral tablet: Take 2 tablets twice a day through AM of 11/25. Starting evening of 11/25, take 1 tablets twice daily for 3 months  atorvastatin 80 mg oral tablet: 1 tab(s) orally once a day  carvedilol 12.5 mg oral tablet: 1 tab(s) orally 2 times a day  CBC, CMP, ESR, CRP lab draws 1x/ week for 6 weeks: Start date: 11/20/19  Send results to ID Dr. Inessa Schreiber.  Fax: [phone]    ICD10- T84.52XA  ferrous sulfate 325 mg (65 mg elemental iron) oral tablet: 1 tab(s) orally 3 times a day  gabapentin 100 mg oral capsule: 1 cap(s) orally 3 times a day MDD:3  heparin 3ml from 5ml syringe: Administer after each infusion  NS flush 10mL: Administer before and after each infusion  oxycodone-acetaminophen 5 mg-325 mg oral tablet: 1-2 tab(s) orally every 4-6 hours, as needed for moderate to severe pain MDD:6  rifAMPin 300 mg oral capsule: 1 cap(s) orally every 12 hours until 12/23/19. acetaminophen 325 mg oral tablet: 2 tab(s) orally every 6 hours, as needed for mild natalie. DO NOT EXCEED 4,000mg ACETAMINOPHEN IN 1 DAY  AFO brace (left): diagnosis: M21.379  apixaban 5 mg oral tablet: Take 2 tablets twice a day through AM of 11/25. Starting evening of 11/25, take 1 tablets twice daily for 3 months  atorvastatin 80 mg oral tablet: 1 tab(s) orally once a day  carvedilol 12.5 mg oral tablet: 1 tab(s) orally 2 times a day  CBC, CMP, ESR, CRP lab draws 1x/ week for 6 weeks: Start date: 11/20/19  Send results to ID Dr. Inessa Schreiber.  Fax: [phone]    ICD10- T84.52XA  ferrous sulfate 325 mg (65 mg elemental iron) oral tablet: 1 tab(s) orally 3 times a day  gabapentin 100 mg oral capsule: 1 cap(s) orally 3 times a day MDD:3  heparin 3ml from 5ml syringe: Administer after each infusion  NS flush 10mL: Administer before and after each infusion  oxycodone-acetaminophen 5 mg-325 mg oral tablet: 1-2 tab(s) orally every 4-6 hours, as needed for moderate to severe pain MDD:6  rifAMPin 300 mg oral capsule: 1 cap(s) orally every 12 hours until 12/23/19.

## 2019-11-12 NOTE — PHYSICAL THERAPY INITIAL EVALUATION ADULT - ADDITIONAL COMMENTS
[place], 2 steps to enter. Pt owns all equip, was using cane or crutches as needed prior to surgery

## 2019-11-12 NOTE — DISCHARGE NOTE PROVIDER - CARE PROVIDERS DIRECT ADDRESSES
[email or web address]

## 2019-11-12 NOTE — DIETITIAN INITIAL EVALUATION ADULT. - ENERGY NEEDS
Ht (11/11): 182.9cm, Wt (11/11): 108kg, IBW: 80.9kg+/-10%, %IBW: 133%, BMI: 32.3   IBW used to calculate energy needs due to pt's current body weight exceeding 120% of IBW. Needs adjusted for age.

## 2019-11-12 NOTE — OCCUPATIONAL THERAPY INITIAL EVALUATION ADULT - ADDITIONAL COMMENTS
Patient reports independence with ADL, except the occasional assist with LB dressing from wife, as well as ambulating with a SC. However patient reports recent switch to axillary crutches 2/2 pain. Patient also reports having had home therapy post prior 2 procedures. Patient reports owning a commode and hip kit.

## 2019-11-12 NOTE — PROGRESS NOTE ADULT - ASSESSMENT
A/P: 64yMale s/p L FRANCINE periprosthetic infection with FRANCINE explant and antibiotic spacer placement  - Hypotensive -- PACU Hgb 8.7 -- Giving 1 U pRBC now  - Pain Control  - DVT ppx: ASA 81 BID  - Post op abx: Ancef 2g Q8H  - WBS: WBAT LLE  - PT: pending PT eval    Ortho Pager [phone] A/P: 64yMale s/p L FRANCINE periprosthetic infection with FRANCINE explant and antibiotic spacer placement  - Hypotensive -- PACU Hgb 8.7 -- Giving 1 U pRBC now  - Pain Control  - DVT ppx: ASA 81 BID  - Will monitor foot drop sxs -- Let positioned with L hip extension and L knee flexion  - Post op abx: Ancef 2g Q8H  - WBS: WBAT LLE  - PT: pending PT eval    Ortho Pager [phone]

## 2019-11-12 NOTE — DISCHARGE NOTE PROVIDER - NSDCFUADDINST_GEN_ALL_CORE_FT
See attached instructions from Dr. Mead  Weight bear as tolerated with assistive device  No strenuous activity, heavy lifting, driving or returning to work until cleared by MD.  You may shower - dressing is water-resistant, no soaking in bathtubs.  Remove dressing after post op day 5-7, then leave incision open to air. Keep incision clean and dry.  Try to have regular bowel movements, take stool softener or laxative if necessary.  May take Pepcid or Zantac for upset stomach.  May take Aleve or Naproxen instead of Meloxicam/Celebrex.  Swelling may travel all the way down leg to foot, this is normal and will subside in a few weeks.  Call to schedule an appt with Dr. Mead for follow up, if you have staples or sutures they will be removed in office.  Contact your doctor if you experience: fever greater than 101.5, chills, chest pain, difficulty breathing, redness or excessive drainage around the incision, other concerns.  Follow up with your primary care provider. 50% partial weight-bearing with assistive device. No active hip abduction.   No strenuous activity, heavy lifting, driving or returning to work until cleared by MD.  You may shower - dressing is water-resistant, no soaking in bathtubs. Keep the battery pack dry.   No soaking in bathtubs.  The dressing has a battery that usually dies in 7 days. Once this occurs, you may remove the dressing and dispose of it, then leave incision open to air. Keep incision clean and dry.    Try to have regular bowel movements, take stool softener or laxative if necessary.  Swelling may travel all the way down leg to foot, this is normal and will subside in a few weeks.  Call to schedule an appt with Dr. Mead for follow up, if you have staples or sutures they will be removed in office.  Contact your doctor if you experience: fever greater than 101.5, chills, chest pain, difficulty breathing, redness or excessive drainage around the incision, other concerns.  Follow up with your primary care provider. 50% partial weight-bearing with assistive device. No active hip abduction.   No strenuous activity, heavy lifting, driving or returning to work until cleared by MD.  You may shower - dressing is water-resistant, no soaking in bathtubs. Keep incision clean and dry. The dressing can be removed in 1 week and left open to air.    Try to have regular bowel movements, take stool softener or laxative if necessary.  Swelling may travel all the way down leg to foot, this is normal and will subside in a few weeks.  Call to schedule an appt with Dr. Mead for follow up, if you have staples or sutures they will be removed in office.  Contact your doctor if you experience: fever greater than 101.5, chills, chest pain, difficulty breathing, redness or excessive drainage around the incision, other concerns.  Follow up with your primary care provider.

## 2019-11-12 NOTE — CONSULT NOTE ADULT - SUBJECTIVE AND OBJECTIVE BOX
HPI:  65 yo M with HTN, HLD, CAD s/p multiple stents, s/p B THR with PPJI L hip.  He had L THR on 4/25/19 c/b infection with DAIR 5/22/19.  He was initially seen by Dr. Mead on 6/19 – was on antibiotics at that time.  He was afebrile, WBC 5.97, ESR 70 CRP 3.15.  Was unclear whether or not he had treatment failure.  He continued his po antibiotics.  He was seen for f/u on 10/31.  At that time, he reported having received a cortisone injection into the hip for trochanteric bursitis.  He was found to have mild generalized erythema around the incision and a fluctuant peritrochanteric collection.  He underwent diagnostic aspiration.  Fluid was turbid with 172,000 nucleated cells (98% PMNs), 28,000 RBCs.    Culture grew numerous MSSA (S rif).  He was started on cephalexin on 11/5 and admitted on 11/10.  He was afebrile with WBC 12.83.  On 11/11, he underwent explantation and revision with spacer insertion.  He has been maintained on cefazolin.  ID consult requested.      PAST MEDICAL & SURGICAL HISTORY:  Hypertension  Hyperlipidemia          MEDICATIONS  (STANDING):  acetaminophen   Tablet .. 650 milliGRAM(s) Oral every 6 hours  aspirin enteric coated 81 milliGRAM(s) Oral two times a day  atorvastatin 80 milliGRAM(s) Oral at bedtime  BUpivacaine liposome 1.3% Injectable (no eMAR) 20 milliLiter(s) Local Injection once  carvedilol 12.5 milliGRAM(s) Oral every 12 hours  ceFAZolin  Injectable. 2000 milliGRAM(s) IV Push every 8 hours  lactated ringers. 1000 milliLiter(s) (80 mL/Hr) IV Continuous <Continuous>  povidone iodine 10% Swab 1 Application(s) Topical once    MEDICATIONS  (PRN):  HYDROmorphone  Injectable 0.5 milliGRAM(s) IV Push every 4 hours PRN Severe Pain (7 - 10)  HYDROmorphone  Injectable 0.5 milliGRAM(s) IV Push every 15 minutes PRN pacu pain  metoclopramide Injectable 10 milliGRAM(s) IV Push every 6 hours PRN Nausea and/or Vomiting  oxyCODONE    IR 10 milliGRAM(s) Oral every 4 hours PRN Severe Pain (7 - 10)  oxyCODONE    IR 5 milliGRAM(s) Oral every 4 hours PRN Moderate Pain (4 - 6)      Allergies    No Known Allergies    Intolerances        SOCIAL HISTORY:    FAMILY HISTORY:      Vital Signs Last 24 Hrs  T(C): 36.9 (12 Nov 2019 08:15), Max: 37.5 (12 Nov 2019 03:55)  T(F): 98.5 (12 Nov 2019 08:15), Max: 99.5 (12 Nov 2019 03:55)  HR: 90 (12 Nov 2019 08:15) (61 - 96)  BP: 121/73 (12 Nov 2019 08:15) (72/38 - 133/79)  BP(mean): 80 (12 Nov 2019 03:30) (46 - 82)  RR: 17 (12 Nov 2019 08:15) (13 - 20)  SpO2: 97% (12 Nov 2019 08:15) (95% - 99%)    PE:  WDWN in no distress  HEENT:  NC, PERRL, sclerae anicteric, conjunctivae clear, EOMI.  Sinuses nontender, no nasal exudate.  No buccal or pharyngeal lesions, erythema or exudate  Neck:  Supple, no adenopathy  Lungs:  Clear to auscultation  Cor:  RRR, S1, S2, no murmur appreciated  Abd:  Symmetric, normoactive BS.  Soft, nontender, no masses, guarding or rebound.  Liver and spleen not enlarged  Extrem:  No cyanosis or edema  Skin:  No rashes.    LABS:                        8.7    20.54 )-----------( 440      ( 11 Nov 2019 23:24 )             27.7     11-11    137  |  103  |  21  ----------------------------<  242<H>  5.1   |  20<L>  |  1.24    Ca    8.6      11 Nov 2019 23:24    TPro  7.6  /  Alb  3.7  /  TBili  0.2  /  DBili  x   /  AST  13  /  ALT  13  /  AlkPhos  63  11-10    Urinalysis Basic - ( 10 Nov 2019 20:19 )    Color: Yellow / Appearance: Clear / SG: >=1.030 / pH: x  Gluc: x / Ketone: NEGATIVE  / Bili: Negative / Urobili: 0.2 E.U./dL   Blood: x / Protein: NEGATIVE mg/dL / Nitrite: NEGATIVE   Leuk Esterase: NEGATIVE / RBC: x / WBC x   Sq Epi: x / Non Sq Epi: x / Bacteria: x    MICROBIOLOGY:  Blood cultures:  11/10 - NGTD    OR Cultures 11/12:  Tissue - L hip synovium tissue OR specimen  Fungal culture - deep L hip OR specimen  Fungal culture - superficial L hip OR specimen          RADIOLOGY & ADDITIONAL STUDIES:  < from: Xray Chest 2 Views PA/Lat (11.10.19 @ 20:42) >  Impression:    Lungs grossly clear. No lung consolidation or pleural effusion. Coronary   stents. Normal size heart. No pneumothorax. No acute bone abnormality.      < end of copied text >    < from: Xray Hip 2-3 Views, Left (11.10.19 @ 20:40) >  Impression:    Left hip prosthesis, similar in appearance to prior exam 10/31/2019. No   acute fracture ordislocation.        < end of copied text > HPI:  63 yo M with HTN, HLD, CAD s/p multiple stents, s/p B THR with PPJI L hip.  He had L THR on 4/25/19 c/b infection with DAIR 5/22/19.  Per Mr. Maya, cultures grew Staph aureus.  He was treated with nafcillin and rifampin X 8 weeks, then an oral antibiotic (he does not know which - took bid) X 3 months.  He was initially seen by Dr. Mead on 6/19 – was on antibiotics at that time.  He was afebrile, WBC 5.97, ESR 70 CRP 3.15.  Was unclear whether or not he had treatment failure.  He continued his po antibiotics until 10/18.  He states that he had ongoing pain throughout but that he became worse immediately after d/cing antibiotics.  He was seen for f/u by Dr. Mead on 10/31.  At that time, he reported having received a cortisone injection into the hip for trochanteric bursitis.  He was found to have mild generalized erythema around the incision and a fluctuant peritrochanteric collection.  He underwent diagnostic aspiration.  Fluid was turbid with 172,000 nucleated cells (98% PMNs), 28,000 RBCs.  Culture grew numerous MSSA (S rif).  He was started on cephalexin on 11/5 and admitted on 11/10.  He was afebrile with WBC 12.83.  On 11/11, he underwent explantation and revision with spacer insertion.  He has been maintained on cefazolin.  ID consult requested.  No fever or chills PTA - had very occ sweats.  He has had L foot drop since surgery.      PAST MEDICAL & SURGICAL HISTORY:  Hypertension  Hyperlipidemia  R hip - pinned s/p fracture then THR 2014  Tendon repair R foot  L wrist surgery (trauma)  Arthroscopic meniscus repair R knee  Lumbar discectomy        MEDICATIONS  (STANDING):  acetaminophen   Tablet .. 650 milliGRAM(s) Oral every 6 hours  aspirin enteric coated 81 milliGRAM(s) Oral two times a day  atorvastatin 80 milliGRAM(s) Oral at bedtime  BUpivacaine liposome 1.3% Injectable (no eMAR) 20 milliLiter(s) Local Injection once  carvedilol 12.5 milliGRAM(s) Oral every 12 hours  ceFAZolin  Injectable. 2000 milliGRAM(s) IV Push every 8 hours  lactated ringers. 1000 milliLiter(s) (80 mL/Hr) IV Continuous <Continuous>  povidone iodine 10% Swab 1 Application(s) Topical once    MEDICATIONS  (PRN):  HYDROmorphone  Injectable 0.5 milliGRAM(s) IV Push every 4 hours PRN Severe Pain (7 - 10)  HYDROmorphone  Injectable 0.5 milliGRAM(s) IV Push every 15 minutes PRN pacu pain  metoclopramide Injectable 10 milliGRAM(s) IV Push every 6 hours PRN Nausea and/or Vomiting  oxyCODONE    IR 10 milliGRAM(s) Oral every 4 hours PRN Severe Pain (7 - 10)  oxyCODONE    IR 5 milliGRAM(s) Oral every 4 hours PRN Moderate Pain (4 - 6)      Allergies    No Known Allergies    Intolerances        SOCIAL HISTORY:  Originally from [place], lives in [facility] with wife and cat.  Has 2 step children.  Is a computer .  Drinks ~2 glasses of wine per day, no tobacco or recreational drug use.  Recent travel around [facility].    FAMILY HISTORY:  No pertinent FH in first degree relatives.    ROS:  No HA, photophobia, neck stiffness, rhinorrhea.  Mild sore throat since surgery, no cough, CP, SOB, N, V, diarrhea, abd pain, dysuria, hematuria, frequency, muscle/joint symptoms, bruising/bleeding.    Vital Signs Last 24 Hrs  T(C): 36.9 (12 Nov 2019 08:15), Max: 37.5 (12 Nov 2019 03:55)  T(F): 98.5 (12 Nov 2019 08:15), Max: 99.5 (12 Nov 2019 03:55)  HR: 90 (12 Nov 2019 08:15) (61 - 96)  BP: 121/73 (12 Nov 2019 08:15) (72/38 - 133/79)  BP(mean): 80 (12 Nov 2019 03:30) (46 - 82)  RR: 17 (12 Nov 2019 08:15) (13 - 20)  SpO2: 97% (12 Nov 2019 08:15) (95% - 99%)    PE:  WDWN in no distress  HEENT:  NC, PERRL, sclerae anicteric, conjunctivae clear, EOMI.  Sinuses nontender, no nasal exudate.  No buccal or pharyngeal lesions, erythema or exudate  Neck:  Supple, no adenopathy  Lungs:  Clear to auscultation  Cor:  RRR, S1, S2, no murmur appreciated  Abd:  Symmetric, normoactive BS.  Soft, nontender, no masses, guarding or rebound.  Liver and spleen not enlarged  Extrem:  No cyanosis or edema.  L hip incision with VAC dressing, one drain in thigh.  Unable to elevate toes of L foot.  LE normal warmth  Skin:  No rashes.    LABS:                        8.7    20.54 )-----------( 440      ( 11 Nov 2019 23:24 )             27.7     11-11    137  |  103  |  21  ----------------------------<  242<H>  5.1   |  20<L>  |  1.24    Ca    8.6      11 Nov 2019 23:24    TPro  7.6  /  Alb  3.7  /  TBili  0.2  /  DBili  x   /  AST  13  /  ALT  13  /  AlkPhos  63  11-10    Urinalysis Basic - ( 10 Nov 2019 20:19 )    Color: Yellow / Appearance: Clear / SG: >=1.030 / pH: x  Gluc: x / Ketone: NEGATIVE  / Bili: Negative / Urobili: 0.2 E.U./dL   Blood: x / Protein: NEGATIVE mg/dL / Nitrite: NEGATIVE   Leuk Esterase: NEGATIVE / RBC: x / WBC x   Sq Epi: x / Non Sq Epi: x / Bacteria: x    MICROBIOLOGY:  Blood cultures:  11/10 - NGTD    OR Cultures 11/12:  Tissue - L hip synovium tissue OR specimen:  no orgs seen, mod WBC; culture in progress;   fungal culture in progress    Deep L hip OR specimen:  mod WBCs, rare GPC in prs;  rare Staph aureus;  fungal culture in progress  Superficial L hip OR specimen:  mod WBCs, rare GPC in prs;  rare Staph aureus;  fungal culture in progress        RADIOLOGY & ADDITIONAL STUDIES:  < from: Xray Chest 2 Views PA/Lat (11.10.19 @ 20:42) >  Impression:    Lungs grossly clear. No lung consolidation or pleural effusion. Coronary   stents. Normal size heart. No pneumothorax. No acute bone abnormality.      < end of copied text >    < from: Xray Hip 2-3 Views, Left (11.10.19 @ 20:40) >  Impression:    Left hip prosthesis, similar in appearance to prior exam 10/31/2019. No   acute fracture ordislocation.        < end of copied text >

## 2019-11-12 NOTE — DISCHARGE NOTE PROVIDER - HOSPITAL COURSE
Admitted    Surgery - explant and antibiotic spacer left hip    Verito-op Antibiotics    Pain control    DVT prophylaxis    OOB/Physical Therapy    Infectious disease consult

## 2019-11-12 NOTE — DISCHARGE NOTE PROVIDER - CARE PROVIDER_API CALL
Rogerio Mead)  Orthopaedic Surgery  [address] [place] [address]  Phone: [phone]  Fax: [phone]  Follow Up Time: Rogerio Mead)  Orthopaedic Surgery  [address] [place] [address]  Phone: [phone]  Fax: [phone]  Follow Up Time:     Mac Buenrostro)  Vascular Surgery  [address] [place] [address]  Phone: [phone]  Fax: [phone]  Follow Up Time: 2 weeks    Inessa Schreiber)  Infectious Disease; Internal Medicine  [address] [place] [address]  Phone: [phone]  Fax: [phone]  Follow Up Time: 2 weeks

## 2019-11-12 NOTE — PROGRESS NOTE ADULT - SUBJECTIVE AND OBJECTIVE BOX
SUBJECTIVE: Patient seen and examined.     OBJECTIVE:  NAD  Vital Signs Last 24 Hrs  T(C): 37.5 (12 Nov 2019 03:55), Max: 37.5 (12 Nov 2019 03:55)  T(F): 99.5 (12 Nov 2019 03:55), Max: 99.5 (12 Nov 2019 03:55)  HR: 95 (12 Nov 2019 03:55) (61 - 96)  BP: 106/61 (12 Nov 2019 03:55) (72/38 - 133/79)  BP(mean): 80 (12 Nov 2019 03:30) (46 - 82)  RR: 16 (12 Nov 2019 03:55) (13 - 20)  SpO2: 96% (12 Nov 2019 03:55) (95% - 99%)    General: Pt Alert and oriented, NAD; HV x1 holding suction  LLE: Prevena DSG C/D/I holding suction  Pulses: DP pulse 2+; Cap refill < 2 sec  Sensation: Diminished sensation over plantar and dorsal surfaces of the foot, SILT and symmetric to contralateral extremity  Motor: FHL/GS firing and symmetric to contralateral extremity; EHL/TA not firing               8.7    20.54 )-----------( 440      ( 11 Nov 2019 23:24 )             27.7     11-11    137  |  103  |  21  ----------------------------<  242<H>  5.1   |  20<L>  |  1.24    Ca    8.6      11 Nov 2019 23:24    TPro  7.6  /  Alb  3.7  /  TBili  0.2  /  DBili  x   /  AST  13  /  ALT  13  /  AlkPhos  63  11-10    I&O's Detail    10 Nov 2019 07:01  -  11 Nov 2019 07:00  --------------------------------------------------------  IN:    lactated ringers.: 640 mL  Total IN: 640 mL    OUT:    Voided: 355 mL  Total OUT: 355 mL    Total NET: 285 mL      11 Nov 2019 07:01  -  12 Nov 2019 05:58  --------------------------------------------------------  IN:    lactated ringers.: 780 mL    Packed Red Blood Cells: 240 mL  Total IN: 1020 mL    OUT:    Accordian: 300 mL    Voided: 50 mL  Total OUT: 350 mL    Total NET: 670 mL        A/P :  Pt is a 63yo Male s/p  L FRANCINE explant, antibiotic spacer 11/11  -    Pain control  -    DVT ppx: SCD/ASA  -    standing abx  -    f/u cultures  -    appreciate ID recs  -    f/u LLE exam     -    Weight bearing status: 50% PWB LLE  -    Physical Therapy  -    Dispo: pending PT    Ritesh Huitron MD  Senior Orthopaedic Resident  Orthopaedic Surgery

## 2019-11-13 ENCOUNTER — APPOINTMENT (OUTPATIENT)
Dept: HEART AND VASCULAR | Facility: CLINIC | Age: 64
End: 2019-11-13

## 2019-11-13 DIAGNOSIS — Z98.890 OTHER SPECIFIED POSTPROCEDURAL STATES: ICD-10-CM

## 2019-11-13 DIAGNOSIS — I82.412 ACUTE EMBOLISM AND THROMBOSIS OF LEFT FEMORAL VEIN: ICD-10-CM

## 2019-11-13 DIAGNOSIS — D50.0 IRON DEFICIENCY ANEMIA SECONDARY TO BLOOD LOSS (CHRONIC): ICD-10-CM

## 2019-11-13 DIAGNOSIS — M21.379 FOOT DROP, UNSPECIFIED FOOT: ICD-10-CM

## 2019-11-13 LAB
-  CEFAZOLIN: SIGNIFICANT CHANGE UP
-  CEFAZOLIN: SIGNIFICANT CHANGE UP
-  CLINDAMYCIN: SIGNIFICANT CHANGE UP
-  CLINDAMYCIN: SIGNIFICANT CHANGE UP
-  ERYTHROMYCIN: SIGNIFICANT CHANGE UP
-  ERYTHROMYCIN: SIGNIFICANT CHANGE UP
-  LINEZOLID: SIGNIFICANT CHANGE UP
-  LINEZOLID: SIGNIFICANT CHANGE UP
-  OXACILLIN: SIGNIFICANT CHANGE UP
-  OXACILLIN: SIGNIFICANT CHANGE UP
-  PENICILLIN: SIGNIFICANT CHANGE UP
-  PENICILLIN: SIGNIFICANT CHANGE UP
-  RIFAMPIN: SIGNIFICANT CHANGE UP
-  RIFAMPIN: SIGNIFICANT CHANGE UP
-  TRIMETHOPRIM/SULFAMETHOXAZOLE: SIGNIFICANT CHANGE UP
-  TRIMETHOPRIM/SULFAMETHOXAZOLE: SIGNIFICANT CHANGE UP
-  VANCOMYCIN: SIGNIFICANT CHANGE UP
-  VANCOMYCIN: SIGNIFICANT CHANGE UP
ANION GAP SERPL CALC-SCNC: 10 MMOL/L — SIGNIFICANT CHANGE UP (ref 5–17)
BLD GP AB SCN SERPL QL: NEGATIVE — SIGNIFICANT CHANGE UP
BUN SERPL-MCNC: 20 MG/DL — SIGNIFICANT CHANGE UP (ref 7–23)
CALCIUM SERPL-MCNC: 8.2 MG/DL — LOW (ref 8.4–10.5)
CHLORIDE SERPL-SCNC: 99 MMOL/L — SIGNIFICANT CHANGE UP (ref 96–108)
CO2 SERPL-SCNC: 26 MMOL/L — SIGNIFICANT CHANGE UP (ref 22–31)
CREAT SERPL-MCNC: 0.98 MG/DL — SIGNIFICANT CHANGE UP (ref 0.5–1.3)
CRP SERPL-MCNC: 8.41 MG/DL — HIGH (ref 0–0.4)
CULTURE RESULTS: NO GROWTH — SIGNIFICANT CHANGE UP
CULTURE RESULTS: SIGNIFICANT CHANGE UP
ERYTHROCYTE [SEDIMENTATION RATE] IN BLOOD: 57 MM/HR — HIGH
GLUCOSE SERPL-MCNC: 139 MG/DL — HIGH (ref 70–99)
HCT VFR BLD CALC: 18.9 % — CRITICAL LOW (ref 39–50)
HCT VFR BLD CALC: 21.8 % — LOW (ref 39–50)
HGB BLD-MCNC: 6.6 G/DL — CRITICAL LOW (ref 13–17)
HGB BLD-MCNC: 7.4 G/DL — LOW (ref 13–17)
MCHC RBC-ENTMCNC: 30.2 PG — SIGNIFICANT CHANGE UP (ref 27–34)
MCHC RBC-ENTMCNC: 31.1 PG — SIGNIFICANT CHANGE UP (ref 27–34)
MCHC RBC-ENTMCNC: 33.9 GM/DL — SIGNIFICANT CHANGE UP (ref 32–36)
MCHC RBC-ENTMCNC: 34.9 GM/DL — SIGNIFICANT CHANGE UP (ref 32–36)
MCV RBC AUTO: 89 FL — SIGNIFICANT CHANGE UP (ref 80–100)
MCV RBC AUTO: 89.2 FL — SIGNIFICANT CHANGE UP (ref 80–100)
METHOD TYPE: SIGNIFICANT CHANGE UP
METHOD TYPE: SIGNIFICANT CHANGE UP
NRBC # BLD: 0 /100 WBCS — SIGNIFICANT CHANGE UP (ref 0–0)
NRBC # BLD: 0 /100 WBCS — SIGNIFICANT CHANGE UP (ref 0–0)
ORGANISM # SPEC MICROSCOPIC CNT: SIGNIFICANT CHANGE UP
PLATELET # BLD AUTO: 240 K/UL — SIGNIFICANT CHANGE UP (ref 150–400)
PLATELET # BLD AUTO: 245 K/UL — SIGNIFICANT CHANGE UP (ref 150–400)
POTASSIUM SERPL-MCNC: 4 MMOL/L — SIGNIFICANT CHANGE UP (ref 3.5–5.3)
POTASSIUM SERPL-SCNC: 4 MMOL/L — SIGNIFICANT CHANGE UP (ref 3.5–5.3)
RBC # BLD: 2.12 M/UL — LOW (ref 4.2–5.8)
RBC # BLD: 2.45 M/UL — LOW (ref 4.2–5.8)
RBC # FLD: 12 % — SIGNIFICANT CHANGE UP (ref 10.3–14.5)
RBC # FLD: 12.3 % — SIGNIFICANT CHANGE UP (ref 10.3–14.5)
RH IG SCN BLD-IMP: POSITIVE — SIGNIFICANT CHANGE UP
SODIUM SERPL-SCNC: 135 MMOL/L — SIGNIFICANT CHANGE UP (ref 135–145)
SPECIMEN SOURCE: SIGNIFICANT CHANGE UP
WBC # BLD: 8.47 K/UL — SIGNIFICANT CHANGE UP (ref 3.8–10.5)
WBC # BLD: 9.57 K/UL — SIGNIFICANT CHANGE UP (ref 3.8–10.5)
WBC # FLD AUTO: 8.47 K/UL — SIGNIFICANT CHANGE UP (ref 3.8–10.5)
WBC # FLD AUTO: 9.57 K/UL — SIGNIFICANT CHANGE UP (ref 3.8–10.5)

## 2019-11-13 PROCEDURE — 99232 SBSQ HOSP IP/OBS MODERATE 35: CPT

## 2019-11-13 PROCEDURE — 99232 SBSQ HOSP IP/OBS MODERATE 35: CPT | Mod: GC

## 2019-11-13 PROCEDURE — 99222 1ST HOSP IP/OBS MODERATE 55: CPT | Mod: GC

## 2019-11-13 RX ORDER — ENOXAPARIN SODIUM 100 MG/ML
100 INJECTION SUBCUTANEOUS EVERY 24 HOURS
Refills: 0 | Status: DISCONTINUED | OUTPATIENT
Start: 2019-11-13 | End: 2019-11-13

## 2019-11-13 RX ORDER — ENOXAPARIN SODIUM 100 MG/ML
110 INJECTION SUBCUTANEOUS EVERY 12 HOURS
Refills: 0 | Status: DISCONTINUED | OUTPATIENT
Start: 2019-11-13 | End: 2019-11-18

## 2019-11-13 RX ADMIN — OXYCODONE HYDROCHLORIDE 10 MILLIGRAM(S): 5 TABLET ORAL at 23:10

## 2019-11-13 RX ADMIN — OXYCODONE HYDROCHLORIDE 10 MILLIGRAM(S): 5 TABLET ORAL at 06:30

## 2019-11-13 RX ADMIN — Medication 650 MILLIGRAM(S): at 12:50

## 2019-11-13 RX ADMIN — Medication 325 MILLIGRAM(S): at 13:22

## 2019-11-13 RX ADMIN — NAFCILLIN 200 GRAM(S): 10 INJECTION, POWDER, FOR SOLUTION INTRAVENOUS at 13:22

## 2019-11-13 RX ADMIN — Medication 650 MILLIGRAM(S): at 23:10

## 2019-11-13 RX ADMIN — Medication 650 MILLIGRAM(S): at 18:51

## 2019-11-13 RX ADMIN — ENOXAPARIN SODIUM 110 MILLIGRAM(S): 100 INJECTION SUBCUTANEOUS at 00:43

## 2019-11-13 RX ADMIN — Medication 650 MILLIGRAM(S): at 17:51

## 2019-11-13 RX ADMIN — Medication 650 MILLIGRAM(S): at 01:43

## 2019-11-13 RX ADMIN — Medication 650 MILLIGRAM(S): at 05:32

## 2019-11-13 RX ADMIN — OXYCODONE HYDROCHLORIDE 10 MILLIGRAM(S): 5 TABLET ORAL at 13:22

## 2019-11-13 RX ADMIN — OXYCODONE HYDROCHLORIDE 10 MILLIGRAM(S): 5 TABLET ORAL at 14:22

## 2019-11-13 RX ADMIN — ATORVASTATIN CALCIUM 80 MILLIGRAM(S): 80 TABLET, FILM COATED ORAL at 21:31

## 2019-11-13 RX ADMIN — NAFCILLIN 200 GRAM(S): 10 INJECTION, POWDER, FOR SOLUTION INTRAVENOUS at 09:00

## 2019-11-13 RX ADMIN — OXYCODONE HYDROCHLORIDE 10 MILLIGRAM(S): 5 TABLET ORAL at 18:01

## 2019-11-13 RX ADMIN — NAFCILLIN 200 GRAM(S): 10 INJECTION, POWDER, FOR SOLUTION INTRAVENOUS at 02:00

## 2019-11-13 RX ADMIN — OXYCODONE HYDROCHLORIDE 10 MILLIGRAM(S): 5 TABLET ORAL at 19:01

## 2019-11-13 RX ADMIN — Medication 325 MILLIGRAM(S): at 21:31

## 2019-11-13 RX ADMIN — OXYCODONE HYDROCHLORIDE 10 MILLIGRAM(S): 5 TABLET ORAL at 00:10

## 2019-11-13 RX ADMIN — Medication 325 MILLIGRAM(S): at 05:32

## 2019-11-13 RX ADMIN — Medication 650 MILLIGRAM(S): at 00:10

## 2019-11-13 RX ADMIN — OXYCODONE HYDROCHLORIDE 10 MILLIGRAM(S): 5 TABLET ORAL at 00:43

## 2019-11-13 RX ADMIN — Medication 650 MILLIGRAM(S): at 11:50

## 2019-11-13 RX ADMIN — CARVEDILOL PHOSPHATE 12.5 MILLIGRAM(S): 80 CAPSULE, EXTENDED RELEASE ORAL at 16:48

## 2019-11-13 RX ADMIN — ENOXAPARIN SODIUM 110 MILLIGRAM(S): 100 INJECTION SUBCUTANEOUS at 11:50

## 2019-11-13 RX ADMIN — ENOXAPARIN SODIUM 110 MILLIGRAM(S): 100 INJECTION SUBCUTANEOUS at 23:10

## 2019-11-13 RX ADMIN — OXYCODONE HYDROCHLORIDE 10 MILLIGRAM(S): 5 TABLET ORAL at 05:31

## 2019-11-13 RX ADMIN — NAFCILLIN 200 GRAM(S): 10 INJECTION, POWDER, FOR SOLUTION INTRAVENOUS at 17:51

## 2019-11-13 RX ADMIN — OXYCODONE HYDROCHLORIDE 10 MILLIGRAM(S): 5 TABLET ORAL at 01:43

## 2019-11-13 RX ADMIN — Medication 650 MILLIGRAM(S): at 00:43

## 2019-11-13 RX ADMIN — NAFCILLIN 200 GRAM(S): 10 INJECTION, POWDER, FOR SOLUTION INTRAVENOUS at 21:31

## 2019-11-13 RX ADMIN — NAFCILLIN 200 GRAM(S): 10 INJECTION, POWDER, FOR SOLUTION INTRAVENOUS at 05:32

## 2019-11-13 RX ADMIN — Medication 650 MILLIGRAM(S): at 06:32

## 2019-11-13 NOTE — PROGRESS NOTE ADULT - SUBJECTIVE AND OBJECTIVE BOX
SUBJECTIVE: Patient seen and examined.  L femoral vein thrombus observe in doppler overnight.     OBJECTIVE:  NAD  Vital Signs Last 24 Hrs  T(C): 37.5 (12 Nov 2019 03:55), Max: 37.5 (12 Nov 2019 03:55)  T(F): 99.5 (12 Nov 2019 03:55), Max: 99.5 (12 Nov 2019 03:55)  HR: 95 (12 Nov 2019 03:55) (61 - 96)  BP: 106/61 (12 Nov 2019 03:55) (72/38 - 133/79)  BP(mean): 80 (12 Nov 2019 03:30) (46 - 82)  RR: 16 (12 Nov 2019 03:55) (13 - 20)  SpO2: 96% (12 Nov 2019 03:55) (95% - 99%)    General: Pt Alert and oriented, NAD; HV x1 holding suction  LLE: Prevena DSG C/D/I holding suction  Pulses: DP pulse 2+; Cap refill < 2 sec  Sensation: Diminished sensation over plantar and dorsal surfaces of the foot, SILT and symmetric to contralateral extremity  Motor: FHL/GS firing and symmetric to contralateral extremity; EHL/TA not firing               8.5    17.04 )-----------( 340      ( 12 Nov 2019 10:47 )             25.2     A/P :  Pt is a 65yo Male s/p  L FRANCINE explant, antibiotic spacer 11/11, postop footdrop 11/11, postop L femoral vein thrombus 11/13  -    Pain control  -    DVT ppx: SCD/LVX  -    standing abx  -    f/u cultures -staph  -    appreciate ID recs, vacular recs, medicine recs  -    f/u LLE exam     -    Weight bearing status: 50% PWB LLE  -    Physical Therapy/OT  -    Dispo: Home w.[facility]    Ritesh Huitron MD  Senior Orthopaedic Resident  Orthopaedic Surgery

## 2019-11-13 NOTE — PROGRESS NOTE ADULT - SUBJECTIVE AND OBJECTIVE BOX
Ortho Note    Pt seen and examined. Comfortable without complaints, pain controlled  Denies CP, SOB, N/V. Reports post-op L foot drop as "same as yesterday"    Vital Signs Last 24 Hrs  T(C): 37.4 (11-13-19 @ 08:10), Max: 37.4 (11-13-19 @ 08:10)  T(F): 99.3 (11-13-19 @ 08:10), Max: 99.3 (11-13-19 @ 08:10)  HR: 84 (11-13-19 @ 08:10) (82 - 84)  BP: 114/74 (11-13-19 @ 08:10) (99/57 - 114/74)  BP(mean): --  RR: 16 (11-13-19 @ 08:10) (14 - 16)  SpO2: 94% (11-13-19 @ 08:10) (94% - 99%)  AVSS    focused exam:  General: Pt Alert and oriented, NAD  DSG- prevena to L hip CDI, hemovac x 1 in place draining light- serosanguinous fluid  Pulses: +2DP, WWP feet bilaterally  Sensation: sensation diminished on dorsal aspect of L foot, otherwise SILT and equal to contralateral extremity  Motor: 5/5 FHL/GS, 0/5 EHL/TA                         6.6    9.57  )-----------( 240      ( 13 Nov 2019 06:57 )             18.9   13 Nov 2019 06:57    135    |  99     |  20     ----------------------------<  139    4.0     |  26     |  0.98     Ca    8.2        13 Nov 2019 06:57        A/P  Pt is a 63yo Male s/p  L FRANCINE explant, antibiotic spacer 11/11, postop footdrop 11/11, postop L femoral vein thrombus 11/13  - anemia secondary to blood loss form surgery- H+H 6.6/ 18.9. Transfuse 1 unit PRBCs. Plan d/w medical attending  - L distal femoral vein thrombus- Continue lovenox 110mg q12h, bedrest x 24h  - OR cultures growing rare staph aureus- continue nafcillin + rifampin as per ID rec; will need PICC pending finalization of cultures  - blood cultures NGTD-  continue to monitor  - Pain Control- continue current regimen pain well-controlled  - DVT ppx: lovenox 110mg q12h  - PT, WBS: bedrest x 24h; 50% PWB LLE with no active abduction when OOB tomorrow  - appreciate medicine and ID recs  - dispo: home with [facility], homecare services pending cultures/ medical stabilization    Ortho Pager [phone]

## 2019-11-13 NOTE — PROGRESS NOTE ADULT - SUBJECTIVE AND OBJECTIVE BOX
INTERVAL HPI/OVERNIGHT EVENTS:  Duplex showed L femoral thrombus    CONSTITUTIONAL:  No fever, chills, night sweats  EYES:  No photophobia or visual changes  CARDIOVASCULAR:  No chest pain  RESPIRATORY:  No cough, wheezing, or SOB   GASTROINTESTINAL:  No nausea, vomiting, diarrhea, constipation, or abdominal pain  GENITOURINARY:  No frequency, urgency, dysuria or hematuria  NEUROLOGIC:  No headache, lightheadedness      ANTIBIOTICS/RELEVANT:  Nafcillin 2 g IV q4h (11/12-present)  Rifampin 300 mg po q12h (11/12-present)    Cefazolin 11/11-11/12      Vital Signs Last 24 Hrs  T(C): 37.4 (13 Nov 2019 08:10), Max: 37.4 (13 Nov 2019 08:10)  T(F): 99.3 (13 Nov 2019 08:10), Max: 99.3 (13 Nov 2019 08:10)  HR: 84 (13 Nov 2019 08:10) (79 - 120)  BP: 114/74 (13 Nov 2019 08:10) (99/57 - 158/78)  BP(mean): --  RR: 16 (13 Nov 2019 08:10) (14 - 17)  SpO2: 94% (13 Nov 2019 08:10) (94% - 99%)    PHYSICAL EXAM:  Constitutional:  Well-developed, well nourished  Eyes:  Sclerae anicteric, conjunctivae clear, PERRL  Ear/Nose/Throat:  No nasal exudate or sinus tenderness;  No buccal mucosal lesions, no pharyngeal erythema or exudate	  Neck:  Supple, no adenopathy  Respiratory:  Clear bilaterally  Cardiovascular:  RRR, S1S2, no murmur appreciated  Gastrointestinal:  Symmetric, normoactive BS, soft, NT, no masses, guarding or rebound.  No HSM  Extremities:  No edema      LABS:                        6.6    9.57  )-----------( 240      ( 13 Nov 2019 06:57 )             18.9         11-13    135  |  99  |  20  ----------------------------<  139<H>  4.0   |  26  |  0.98    Ca    8.2<L>      13 Nov 2019 06:57            MICROBIOLOGY:  Blood cultures:  11/10 - NGTD    OR Cultures 11/12:  Tissue - L hip synovium tissue OR specimen:  no orgs seen, mod WBC; Staph aureus;   fungal culture in progress    Deep L hip OR specimen:  mod WBCs, rare GPC in prs;  rare Staph aureus;  fungal culture in progress  Superficial L hip OR specimen:  mod WBCs, rare GPC in prs;  rare Staph aureus;  fungal culture in progress        RADIOLOGY & ADDITIONAL STUDIES:  < from: US Duplex Venous Lower Ext Ltd, Left (11.12.19 @ 19:56) >  IMPRESSION:    Thrombus within the distal left femoral vein. The left peroneal vein   could not be visualized.    < end of copied text > INTERVAL HPI/OVERNIGHT EVENTS:  Duplex showed L femoral thrombus.  His hip pain is improving - ~4/10 - feels better than it has since initial THR.  Has L lateral calf pain and is bothered by foot drop.    CONSTITUTIONAL:  No fever, chills, night sweats  EYES:  No photophobia or visual changes  CARDIOVASCULAR:  No chest pain  RESPIRATORY:  No cough, wheezing, or SOB   GASTROINTESTINAL:  No nausea, vomiting, diarrhea, constipation, or abdominal pain  GENITOURINARY:  No frequency, urgency, dysuria or hematuria  NEUROLOGIC:  No headache, lightheadedness      ANTIBIOTICS/RELEVANT:  Nafcillin 2 g IV q4h (11/12-present)  Rifampin 300 mg po q12h (11/12-present)    Cefazolin 11/11-11/12      Vital Signs Last 24 Hrs  T(C): 37.4 (13 Nov 2019 08:10), Max: 37.4 (13 Nov 2019 08:10)  T(F): 99.3 (13 Nov 2019 08:10), Max: 99.3 (13 Nov 2019 08:10)  HR: 84 (13 Nov 2019 08:10) (79 - 120)  BP: 114/74 (13 Nov 2019 08:10) (99/57 - 158/78)  BP(mean): --  RR: 16 (13 Nov 2019 08:10) (14 - 17)  SpO2: 94% (13 Nov 2019 08:10) (94% - 99%)    PHYSICAL EXAM:  Constitutional:  Well-developed, well nourished  Eyes:  Sclerae anicteric, conjunctivae clear, PERRL  Ear/Nose/Throat:  No nasal exudate or sinus tenderness;  No buccal mucosal lesions, no pharyngeal erythema or exudate	  Neck:  Supple, no adenopathy  Respiratory:  Clear bilaterally  Cardiovascular:  RRR, S1S2, no murmur appreciated  Gastrointestinal:  Symmetric, normoactive BS, soft, NT, no masses, guarding or rebound.  No HSM  Extremities:  No edema.  L hip incision with vac and drain with sanguinous drainage.  L foot drop.  Tender to palpation L lateral calf.      LABS:                        6.6    9.57  )-----------( 240      ( 13 Nov 2019 06:57 )             18.9         11-13    135  |  99  |  20  ----------------------------<  139<H>  4.0   |  26  |  0.98    Ca    8.2<L>      13 Nov 2019 06:57            MICROBIOLOGY:  Blood cultures:  11/10 - NGTD    OR Cultures 11/12:  Tissue - L hip synovium tissue OR specimen:  no orgs seen, mod WBC; Staph aureus;   fungal culture in progress    Deep L hip OR specimen:  mod WBCs, rare GPC in prs;  rare Staph aureus;  fungal culture in progress  Superficial L hip OR specimen:  mod WBCs, rare GPC in prs;  rare Staph aureus;  fungal culture in progress        RADIOLOGY & ADDITIONAL STUDIES:  < from: US Duplex Venous Lower Ext Ltd, Left (11.12.19 @ 19:56) >  IMPRESSION:    Thrombus within the distal left femoral vein. The left peroneal vein   could not be visualized.    < end of copied text >

## 2019-11-13 NOTE — PROGRESS NOTE ADULT - ASSESSMENT
63 yo M with HTN, HLD, CAD s/p stents, B THR with PPJI L hip, initial onset early after surgery in 4/2019, failed DAIR/prolonged IV antibiotics, now s/p explantation and spacer placement, course c/b L foot drop and femoral DVT.  OR cultures are all growing MSSA.  Spacer has metal.  Suggest:  - F/U blood cultures from 11/10   - Continue nafcillin 2 g IV q4h  - Continue rifampin 300 mg po q12h  Recommendations discussed with primary team.  Will continue to follow with you – will transition to ID Team 1 under my care.

## 2019-11-13 NOTE — CONSULT NOTE ADULT - SUBJECTIVE AND OBJECTIVE BOX
Vascular Attending: Dr. Buenrostro    HPI:  64 M history of multiple stents, HTN, HLD w b/l Hip replacements complicated by L hip infection treated w debridement and prolonged period of abx however infection persists after completing antibiotic regimen. Pt states the initial surgery was in April of this year, the infection began shortly after and was  treated w a debridement by the original surgeon at an OSH. after the debridement he was placed on a prolonged course of IV abx and after this finished in october the infection came back as proven by hip aspiration several days ago. After seeing Dr. Mead and sending him updated pictures today 11/10 he was sent in for medical management and for explant and placement of a spacer. Pt does endorse fevers and chills. States he has had purulent drainage from the incision. no numbness, tingling or weakness reported. Otherwise healthy (10 Nov 2019 20:51)    Vascular Surgery:  64 M history of multiple stents, HTN, HLD w b/l Hip replacements now admitted with infected left total hip arthroplasty s/p Revision of left total hip arthroplasty by posterior approach 11/11/19. On POD 1, Pt was complaining of left calf pain and so duplex Ultrasound was performed which revealed a left femoral DVT. Pt denies chest pain, SOB, dizziness, numbness or tingling. Vascular surgery consulted for further evaluation.    PAST MEDICAL & SURGICAL HISTORY:  Hypertension  Hyperlipidemia    MEDICATIONS  (STANDING):  acetaminophen   Tablet .. 650 milliGRAM(s) Oral every 6 hours  atorvastatin 80 milliGRAM(s) Oral at bedtime  BUpivacaine liposome 1.3% Injectable (no eMAR) 20 milliLiter(s) Local Injection once  carvedilol 12.5 milliGRAM(s) Oral every 12 hours  enoxaparin Injectable 110 milliGRAM(s) SubCutaneous every 12 hours  ferrous    sulfate 325 milliGRAM(s) Oral three times a day  lactated ringers. 1000 milliLiter(s) (80 mL/Hr) IV Continuous <Continuous>  nafcillin  IVPB 2 Gram(s) IV Intermittent every 4 hours  povidone iodine 10% Swab 1 Application(s) Topical once  rifAMPin 300 milliGRAM(s) Oral every 12 hours    MEDICATIONS  (PRN):  HYDROmorphone  Injectable 0.5 milliGRAM(s) IV Push every 4 hours PRN Severe Pain (7 - 10)  HYDROmorphone  Injectable 0.5 milliGRAM(s) IV Push every 15 minutes PRN pacu pain  metoclopramide Injectable 10 milliGRAM(s) IV Push every 6 hours PRN Nausea and/or Vomiting  oxyCODONE    IR 10 milliGRAM(s) Oral every 4 hours PRN Severe Pain (7 - 10)  oxyCODONE    IR 5 milliGRAM(s) Oral every 4 hours PRN Moderate Pain (4 - 6)      Allergies    No Known Allergies    Intolerances    SOCIAL HISTORY:    FAMILY HISTORY:    Vital Signs Last 24 Hrs  T(C): 37.1 (12 Nov 2019 20:30), Max: 37.5 (12 Nov 2019 03:55)  T(F): 98.7 (12 Nov 2019 20:30), Max: 99.5 (12 Nov 2019 03:55)  HR: 84 (12 Nov 2019 20:30) (79 - 120)  BP: 136/82 (12 Nov 2019 20:30) (97/48 - 158/78)  BP(mean): 80 (12 Nov 2019 03:30) (68 - 82)  RR: 14 (12 Nov 2019 20:30) (14 - 17)  SpO2: 95% (12 Nov 2019 20:30) (95% - 98%)    PHYSICAL EXAM:  Constitutional: NAD, awake, alert  Respiratory: Unlabored breathing, no respiratory distress  Cardiovascular: RRR  Extremities: Mild LLE swelling, left leg soft to palpation, no erythema  Vascular: Palpable left DP/PT      LABS:                        8.5    17.04 )-----------( 340      ( 12 Nov 2019 10:47 )             25.2     11-12    134<L>  |  101  |  22  ----------------------------<  161<H>  4.8   |  24  |  1.17    Ca    8.3<L>      12 Nov 2019 10:47      RADIOLOGY & ADDITIONAL STUDIES  LLE Venous Duplex US  IMPRESSION:    Thrombus within the distal left femoral vein. The left peroneal vein   could not be visualized.        Assessment/Plan:  64 M history of multiple stents, HTN, HLD w b/l Hip replacements now admitted with infected left total hip arthroplasty s/p Revision of left total hip arthroplasty by posterior approach 11/11/19 now with Left femoral vein DVT    Start therapeutic anticoagulation of primary team's choice. Consider starting with heparin gtt and switching to oral meds  Pt will need to be on anticoagulation for three months  F/u with Dr. Buenrostro out patient upon discharge  Plan discussed with vascular surgery chief on call

## 2019-11-13 NOTE — PROGRESS NOTE ADULT - SUBJECTIVE AND OBJECTIVE BOX
Interval Events: Reviewed  Patient seen and examined at bedside.    Patient is a 64y old  Male who presents with a chief complaint of L total hip infection (13 Nov 2019 14:35)  he is tired and pain in juanita left hip    PAST MEDICAL & SURGICAL HISTORY:  Hypertension  Hyperlipidemia      MEDICATIONS:  Pulmonary:    Antimicrobials:  nafcillin  IVPB 2 Gram(s) IV Intermittent every 4 hours  rifAMPin 300 milliGRAM(s) Oral every 12 hours    Anticoagulants:  enoxaparin Injectable 110 milliGRAM(s) SubCutaneous every 12 hours    Cardiac:  carvedilol 12.5 milliGRAM(s) Oral every 12 hours      Allergies    No Known Allergies    Intolerances        Vital Signs Last 24 Hrs  T(C): 36.6 (13 Nov 2019 15:22), Max: 37.4 (13 Nov 2019 08:10)  T(F): 97.8 (13 Nov 2019 15:22), Max: 99.3 (13 Nov 2019 08:10)  HR: 86 (13 Nov 2019 15:22) (82 - 94)  BP: 125/68 (13 Nov 2019 15:22) (99/57 - 136/82)  BP(mean): --  RR: 17 (13 Nov 2019 15:22) (14 - 17)  SpO2: 95% (13 Nov 2019 15:22) (94% - 99%)    11-12 @ 07:01 - 11-13 @ 07:00  --------------------------------------------------------  IN: 440 mL / OUT: 980 mL / NET: -540 mL    11-13 @ 07:01 - 11-13 @ 16:00  --------------------------------------------------------  IN: 240 mL / OUT: 630 mL / NET: -390 mL          Review of Systems:   •	General: negative  •	Skin/Breast: negative  •	Ophthalmologic: negative  •	ENMT: negative  •	Respiratory and Thorax: negative  •	Cardiovascular: negative  •	Gastrointestinal: negative  •	Genitourinary: negative  •	Musculoskeletal: pain in the left hip  •	Neurological: negative  •	Psychiatric: negative  •	Hematology/Lymphatics: negative  •	Endocrine: negative  •	Allergic/Immunologic: negative    Physical Exam:   • Constitutional:	Well-developed, well nourished  • Eyes:	EOMI; PERRL; no drainage or redness  • ENMT:	No oral lesions; no gross abnormalities  • Neck	No bruits; no thyromegaly or nodules  • Breasts:	not examined  • Back:	No deformity or limitation of movement  • Respiratory:	Breath Sounds equal & clear to percussion & auscultation, no accessory muscle use  • Cardiovascular:	Regular rate & rhythm, normal S1, S2; no murmurs, gallops or rubs; no S3, S4  • Gastrointestinal:	Soft, non-tender, no hepatosplenomegaly, normal bowel sounds  • Genitourinary:	not examined  • Rectal: not examined  • Extremities:	No cyanosis, clubbing or edema  • Vascular:	Equal and normal pulses (carotid, femoral, dorsalis pedis)  • Neurologica:l	not examined  • Skin:	No lesions; no rash  • Lymph Nodes:	No lymphadedenopathy  • Musculoskeletal:	No joint pain, swelling or deformity; no limitation of movement        LABS:      CBC Full  -  ( 13 Nov 2019 06:57 )  WBC Count : 9.57 K/uL  RBC Count : 2.12 M/uL  Hemoglobin : 6.6 g/dL  Hematocrit : 18.9 %  Platelet Count - Automated : 240 K/uL  Mean Cell Volume : 89.2 fl  Mean Cell Hemoglobin : 31.1 pg  Mean Cell Hemoglobin Concentration : 34.9 gm/dL  Auto Neutrophil # : x  Auto Lymphocyte # : x  Auto Monocyte # : x  Auto Eosinophil # : x  Auto Basophil # : x  Auto Neutrophil % : x  Auto Lymphocyte % : x  Auto Monocyte % : x  Auto Eosinophil % : x  Auto Basophil % : x    11-13    135  |  99  |  20  ----------------------------<  139<H>  4.0   |  26  |  0.98    Ca    8.2<L>      13 Nov 2019 06:57              < from: US Duplex Venous Lower Ext Ltd, Left (11.12.19 @ 19:56) >    EXAM:  US DPLX LWR EXT VEINS LTD LT                          PROCEDURE DATE:  11/12/2019          INTERPRETATION:    VENOUS DUPLEX DOPPLER OF THE LEFT LOWER EXTREMITY dated 11/12/2019 7:56 PM    INDICATION: Calf pain and foot drop.    TECHNIQUE: Duplex Doppler evaluation including gray-scale ultrasound   imaging, color flow Doppler imaging, and Doppler spectral analysis of the   veins of the left lower extremity was performed.     COMPARISON: None    FINDINGS:    Thigh veins: There is thrombus in the distal left femoral vein. The left   common femoral, popliteal, proximal greater saphenous, and proximal deep   femoral veins are patent and free of thrombus. The veins are normally   compressible and have normal phasic flow and augmentation response.    Calf veins: The paired posterior tibial calf veins are patent. The left   peroneal vein could not be visualized.    Contralateral CFV: The contralateral (right) common femoral vein is   patent and free of thrombus.      IMPRESSION:    Thrombus within the distal left femoral vein. The left peroneal vein   could not be visualized.    < end of copied text >          Culture Results:   Testing in progress (11-12 @ 01:12)  Culture Results:   Testing in progress (11-12 @ 01:12)  Culture Results:   Testing in progress (11-12 @ 01:11)  Culture Results:   Rare Staphylococcus aureus (11-11 @ 22:15)  Culture Results:   Staphylococcus aureus  See previous culture for susceptibilities (11-11 @ 22:13)  Culture Results:   Rare Staphylococcus aureus (11-11 @ 22:12)      RADIOLOGY & ADDITIONAL STUDIES (The following images were personally reviewed):  Villa:                                     No  Urine output:                       adequate  DVT prophylaxis:                 Yes  Flattus:                                  Yes  Bowel movement:              No

## 2019-11-13 NOTE — CHART NOTE - NSCHARTNOTEFT_GEN_A_CORE
Patient currently on therapeutic anticoagulation. Continue anticoagulation of choice per primary team.  Will require at least 3 months of therapy,  Follow-up with Dr. Buenrostro in the office for surveillance duplex.  Call [phone] for appointments.  Vascular surgery signing off at this time, please reconsult as needed ([phone])

## 2019-11-13 NOTE — PROGRESS NOTE ADULT - PROBLEM SELECTOR PLAN 6
Monitor hemoglobin. Hold transfusion unless hemoglobin is 7, 8 in patient with coronary artery disease, hemodynamic instability such as tachycardia/hypotension, or there is evidence of acute blood loss.  Anemia is due to postoperative blood loss  Transfuse and monitor drains as the patient is on Lovenox

## 2019-11-14 LAB
ANION GAP SERPL CALC-SCNC: 8 MMOL/L — SIGNIFICANT CHANGE UP (ref 5–17)
BUN SERPL-MCNC: 12 MG/DL — SIGNIFICANT CHANGE UP (ref 7–23)
CALCIUM SERPL-MCNC: 8.2 MG/DL — LOW (ref 8.4–10.5)
CHLORIDE SERPL-SCNC: 101 MMOL/L — SIGNIFICANT CHANGE UP (ref 96–108)
CO2 SERPL-SCNC: 26 MMOL/L — SIGNIFICANT CHANGE UP (ref 22–31)
CREAT SERPL-MCNC: 0.86 MG/DL — SIGNIFICANT CHANGE UP (ref 0.5–1.3)
GLUCOSE SERPL-MCNC: 121 MG/DL — HIGH (ref 70–99)
HCT VFR BLD CALC: 19.6 % — CRITICAL LOW (ref 39–50)
HCT VFR BLD CALC: 24.2 % — LOW (ref 39–50)
HGB BLD-MCNC: 6.6 G/DL — CRITICAL LOW (ref 13–17)
HGB BLD-MCNC: 8 G/DL — LOW (ref 13–17)
MCHC RBC-ENTMCNC: 30 PG — SIGNIFICANT CHANGE UP (ref 27–34)
MCHC RBC-ENTMCNC: 30.3 PG — SIGNIFICANT CHANGE UP (ref 27–34)
MCHC RBC-ENTMCNC: 33.1 GM/DL — SIGNIFICANT CHANGE UP (ref 32–36)
MCHC RBC-ENTMCNC: 33.7 GM/DL — SIGNIFICANT CHANGE UP (ref 32–36)
MCV RBC AUTO: 89.1 FL — SIGNIFICANT CHANGE UP (ref 80–100)
MCV RBC AUTO: 91.7 FL — SIGNIFICANT CHANGE UP (ref 80–100)
NRBC # BLD: 0 /100 WBCS — SIGNIFICANT CHANGE UP (ref 0–0)
NRBC # BLD: 0 /100 WBCS — SIGNIFICANT CHANGE UP (ref 0–0)
PLATELET # BLD AUTO: 235 K/UL — SIGNIFICANT CHANGE UP (ref 150–400)
PLATELET # BLD AUTO: 281 K/UL — SIGNIFICANT CHANGE UP (ref 150–400)
POTASSIUM SERPL-MCNC: 3.6 MMOL/L — SIGNIFICANT CHANGE UP (ref 3.5–5.3)
POTASSIUM SERPL-SCNC: 3.6 MMOL/L — SIGNIFICANT CHANGE UP (ref 3.5–5.3)
RBC # BLD: 2.2 M/UL — LOW (ref 4.2–5.8)
RBC # BLD: 2.64 M/UL — LOW (ref 4.2–5.8)
RBC # FLD: 12.5 % — SIGNIFICANT CHANGE UP (ref 10.3–14.5)
RBC # FLD: 12.6 % — SIGNIFICANT CHANGE UP (ref 10.3–14.5)
SODIUM SERPL-SCNC: 135 MMOL/L — SIGNIFICANT CHANGE UP (ref 135–145)
WBC # BLD: 6.74 K/UL — SIGNIFICANT CHANGE UP (ref 3.8–10.5)
WBC # BLD: 8.21 K/UL — SIGNIFICANT CHANGE UP (ref 3.8–10.5)
WBC # FLD AUTO: 6.74 K/UL — SIGNIFICANT CHANGE UP (ref 3.8–10.5)
WBC # FLD AUTO: 8.21 K/UL — SIGNIFICANT CHANGE UP (ref 3.8–10.5)

## 2019-11-14 PROCEDURE — 99232 SBSQ HOSP IP/OBS MODERATE 35: CPT | Mod: GC

## 2019-11-14 RX ORDER — SENNA PLUS 8.6 MG/1
2 TABLET ORAL AT BEDTIME
Refills: 0 | Status: DISCONTINUED | OUTPATIENT
Start: 2019-11-14 | End: 2019-11-19

## 2019-11-14 RX ORDER — MAGNESIUM HYDROXIDE 400 MG/1
30 TABLET, CHEWABLE ORAL DAILY
Refills: 0 | Status: DISCONTINUED | OUTPATIENT
Start: 2019-11-14 | End: 2019-11-19

## 2019-11-14 RX ORDER — ACETAMINOPHEN 500 MG
2 TABLET ORAL
Qty: 0 | Refills: 0 | DISCHARGE
Start: 2019-11-14

## 2019-11-14 RX ORDER — NAFCILLIN 10 G/100ML
2 INJECTION, POWDER, FOR SOLUTION INTRAVENOUS
Qty: 2 | Refills: 0
Start: 2019-11-14

## 2019-11-14 RX ORDER — POLYETHYLENE GLYCOL 3350 17 G/17G
17 POWDER, FOR SOLUTION ORAL DAILY
Refills: 0 | Status: DISCONTINUED | OUTPATIENT
Start: 2019-11-14 | End: 2019-11-19

## 2019-11-14 RX ADMIN — NAFCILLIN 200 GRAM(S): 10 INJECTION, POWDER, FOR SOLUTION INTRAVENOUS at 02:37

## 2019-11-14 RX ADMIN — OXYCODONE HYDROCHLORIDE 10 MILLIGRAM(S): 5 TABLET ORAL at 12:31

## 2019-11-14 RX ADMIN — ATORVASTATIN CALCIUM 80 MILLIGRAM(S): 80 TABLET, FILM COATED ORAL at 21:19

## 2019-11-14 RX ADMIN — NAFCILLIN 200 GRAM(S): 10 INJECTION, POWDER, FOR SOLUTION INTRAVENOUS at 18:57

## 2019-11-14 RX ADMIN — OXYCODONE HYDROCHLORIDE 10 MILLIGRAM(S): 5 TABLET ORAL at 22:00

## 2019-11-14 RX ADMIN — Medication 325 MILLIGRAM(S): at 16:27

## 2019-11-14 RX ADMIN — OXYCODONE HYDROCHLORIDE 10 MILLIGRAM(S): 5 TABLET ORAL at 13:11

## 2019-11-14 RX ADMIN — OXYCODONE HYDROCHLORIDE 10 MILLIGRAM(S): 5 TABLET ORAL at 16:47

## 2019-11-14 RX ADMIN — Medication 650 MILLIGRAM(S): at 07:23

## 2019-11-14 RX ADMIN — Medication 650 MILLIGRAM(S): at 13:11

## 2019-11-14 RX ADMIN — Medication 325 MILLIGRAM(S): at 06:23

## 2019-11-14 RX ADMIN — OXYCODONE HYDROCHLORIDE 10 MILLIGRAM(S): 5 TABLET ORAL at 21:19

## 2019-11-14 RX ADMIN — Medication 650 MILLIGRAM(S): at 06:23

## 2019-11-14 RX ADMIN — CARVEDILOL PHOSPHATE 12.5 MILLIGRAM(S): 80 CAPSULE, EXTENDED RELEASE ORAL at 16:48

## 2019-11-14 RX ADMIN — NAFCILLIN 200 GRAM(S): 10 INJECTION, POWDER, FOR SOLUTION INTRAVENOUS at 15:43

## 2019-11-14 RX ADMIN — NAFCILLIN 200 GRAM(S): 10 INJECTION, POWDER, FOR SOLUTION INTRAVENOUS at 06:23

## 2019-11-14 RX ADMIN — ENOXAPARIN SODIUM 110 MILLIGRAM(S): 100 INJECTION SUBCUTANEOUS at 00:30

## 2019-11-14 RX ADMIN — ENOXAPARIN SODIUM 110 MILLIGRAM(S): 100 INJECTION SUBCUTANEOUS at 12:30

## 2019-11-14 RX ADMIN — CARVEDILOL PHOSPHATE 12.5 MILLIGRAM(S): 80 CAPSULE, EXTENDED RELEASE ORAL at 06:30

## 2019-11-14 RX ADMIN — Medication 650 MILLIGRAM(S): at 12:30

## 2019-11-14 RX ADMIN — Medication 325 MILLIGRAM(S): at 21:19

## 2019-11-14 RX ADMIN — OXYCODONE HYDROCHLORIDE 10 MILLIGRAM(S): 5 TABLET ORAL at 04:57

## 2019-11-14 RX ADMIN — OXYCODONE HYDROCHLORIDE 10 MILLIGRAM(S): 5 TABLET ORAL at 03:57

## 2019-11-14 RX ADMIN — NAFCILLIN 200 GRAM(S): 10 INJECTION, POWDER, FOR SOLUTION INTRAVENOUS at 21:19

## 2019-11-14 RX ADMIN — NAFCILLIN 200 GRAM(S): 10 INJECTION, POWDER, FOR SOLUTION INTRAVENOUS at 12:30

## 2019-11-14 RX ADMIN — Medication 650 MILLIGRAM(S): at 16:47

## 2019-11-14 NOTE — PROGRESS NOTE ADULT - SUBJECTIVE AND OBJECTIVE BOX
Ortho Note    Pt seen and examined. L hip pain well-controlled. Reports L foot drop as not improving.  Denies CP, SOB, N/V, numbness/tingling.  Denies dizziness/ lightheadedness/ fatigue.    Vital Signs Last 24 Hrs  T(C): 36.8 (11-14-19 @ 10:20), Max: 36.8 (11-14-19 @ 10:20)  T(F): 98.3 (11-14-19 @ 10:20), Max: 98.3 (11-14-19 @ 10:20)  HR: 82 (11-14-19 @ 10:20) (82 - 82)  BP: 123/70 (11-14-19 @ 10:20) (123/70 - 123/70)  BP(mean): --  RR: 17 (11-14-19 @ 10:20) (17 - 17)  SpO2: 99% (11-14-19 @ 10:20) (99% - 99%)  AVSS    General: Pt Alert and oriented, NAD  DSG- prevena + hemovac CDI  Pulses: +2DP, WWP feet  Sensation: diminished sensation over dorsal aspect on L foot, otherwise SILT BLE  Motor: 0/5 TA/EHL LLE, 5/5 TA/EHL RLE, 5/5 GS/FHL BLE                          6.6    6.74  )-----------( 235      ( 14 Nov 2019 07:22 )             19.6   14 Nov 2019 07:22    135    |  101    |  12     ----------------------------<  121    3.6     |  26     |  0.86         A/P :  Pt is a 65yo Male s/p  L FRANCINE explant, antibiotic spacer 11/11, postop footdrop 11/11, postop L femoral vein thrombus 11/13  - H+H 6.6/ 19.6- Transfuse 1 unit PRBCs, f/u repeat CBC and in AM  - Pain Control  - DVT ppx: lovenox 110mg bid  - continue hemovac until at least tomorrow  - PT, WBS: 50% PWB LLE  - appreciate ID recs - continue nafcillin q4h, rifampin. Blood cultures NGTD. Hospital does not carry crestor; will switch for d/c  - PICC line   - L foot drop- continue AFO brace   - dispo: home with HPT, pending PICC and medical stabilization      Ortho Pager [phone]

## 2019-11-14 NOTE — PROGRESS NOTE ADULT - PROBLEM SELECTOR PLAN 6
Monitor hemoglobin. Hold transfusion unless hemoglobin is 7, 8 in patient with coronary artery disease, hemodynamic instability such as tachycardia/hypotension, or there is evidence of acute blood loss.  Anemia is due to postoperative blood loss  Transfuse and monitor drains as the patient is on Lovenox  He was transfused as Hb decraesed.  If Hb decraese further then will hold Lovenox and follow with CT scan abdomen and may need IVC filter

## 2019-11-14 NOTE — PROGRESS NOTE ADULT - SUBJECTIVE AND OBJECTIVE BOX
SUBJECTIVE: Patient seen and examined.      OBJECTIVE:  NAD  Vital Signs Last 24 Hrs  T(C): 37.6 (13 Nov 2019 20:26), Max: 37.6 (13 Nov 2019 20:26)  T(F): 99.7 (13 Nov 2019 20:26), Max: 99.7 (13 Nov 2019 20:26)  HR: 77 (13 Nov 2019 20:26) (77 - 89)  BP: 117/67 (13 Nov 2019 20:26) (114/74 - 125/68)  BP(mean): --  RR: 15 (13 Nov 2019 20:26) (15 - 17)  SpO2: 96% (13 Nov 2019 20:26) (94% - 96%)    General: Pt Alert and oriented, NAD; HV x1 holding suction  LLE: Prevena DSG C/D/I holding suction  Pulses: DP pulse 2+; Cap refill < 2 sec  Sensation: Diminished sensation over plantar and dorsal surfaces of the foot, SILT and symmetric to contralateral extremity  Motor: FHL/GS firing and symmetric to contralateral extremity; EHL/TA not firing, AFO in place                      7.4    8.47  )-----------( 245      ( 13 Nov 2019 18:50 )             21.8          A/P :  Pt is a 63yo Male s/p  L FRANCINE explant, antibiotic spacer 11/11, postop footdrop 11/11, postop L femoral vein thrombus 11/13  -    Pain control  -    DVT ppx: SCD/LVX  -    standing abx  -    f/u cultures -staph  -    appreciate ID recs, vacular recs, medicine recs  -    f/u LLE exam     -    Weight bearing status: 50% PWB LLE  -    Physical Therapy/OT  -    [facility]po: [facility]    Ritesh Huitron MD  Senior Orthopaedic Resident  Orthopaedic Surgery

## 2019-11-14 NOTE — PROVIDER CONTACT NOTE (CRITICAL VALUE NOTIFICATION) - RECOMMENDATIONS
awaiting further actions from Ortho Team, elevated HOB, relaxation techniques promoted.
Relaxation. open communication close monitor of drain.

## 2019-11-14 NOTE — PROVIDER CONTACT NOTE (CRITICAL VALUE NOTIFICATION) - ASSESSMENT
patient denies lightheadedness & dizziness
Patient denies lightheadedness, dizziness, states he is tired.

## 2019-11-14 NOTE — PROVIDER CONTACT NOTE (CRITICAL VALUE NOTIFICATION) - BACKGROUND
patient is a 64 year old male with a history of HLD and hypertension
s/p L hip surg patient has a history of HTN and HLD

## 2019-11-14 NOTE — PROVIDER CONTACT NOTE (CRITICAL VALUE NOTIFICATION) - SITUATION
patient s/p explant of L infected hardware
Patient is a 64 year old male ,Dr Gustafson made aware. awaiting further orders.

## 2019-11-14 NOTE — PROGRESS NOTE ADULT - SUBJECTIVE AND OBJECTIVE BOX
INCOMPLETE NOTE    infectious diseases progress note:  MERYL PIZARRO is a 64yMale patient    HIP PAIN    Acute deep vein thrombosis (DVT) of femoral vein of left lower extremity  Anemia due to blood loss  Foot drop  Postoperative state  Preoperative clearance  Coronary artery disease involving native coronary artery of native heart without angina pectoris  Pyogenic arthritis of left hip, due to unspecified organism  Pure hypercholesterolemia  Essential hypertension      ROS:  CONSTITUTIONAL:  Negative fever or chills, feels well, good appetite  EYES:  Negative  blurry vision or double vision  CARDIOVASCULAR:  Negative for chest pain or palpitations  RESPIRATORY:  Negative for cough, wheezing, or SOB   GASTROINTESTINAL:  Negative for nausea, vomiting, diarrhea, constipation, or abdominal pain  GENITOURINARY:  Negative frequency, urgency or dysuria  NEUROLOGIC:  No headache, confusion, dizziness, lightheadedness    Allergies    No Known Allergies    Intolerances        ANTIBIOTICS/RELEVANT:  antimicrobials  nafcillin  IVPB 2 Gram(s) IV Intermittent every 4 hours  rifAMPin 300 milliGRAM(s) Oral every 12 hours    immunologic:    OTHER:  acetaminophen   Tablet .. 650 milliGRAM(s) Oral every 6 hours  atorvastatin 80 milliGRAM(s) Oral at bedtime  BUpivacaine liposome 1.3% Injectable (no eMAR) 20 milliLiter(s) Local Injection once  carvedilol 12.5 milliGRAM(s) Oral every 12 hours  enoxaparin Injectable 110 milliGRAM(s) SubCutaneous every 12 hours  ferrous    sulfate 325 milliGRAM(s) Oral three times a day  HYDROmorphone  Injectable 0.5 milliGRAM(s) IV Push every 4 hours PRN  HYDROmorphone  Injectable 0.5 milliGRAM(s) IV Push every 15 minutes PRN  lactated ringers. 1000 milliLiter(s) IV Continuous <Continuous>  metoclopramide Injectable 10 milliGRAM(s) IV Push every 6 hours PRN  oxyCODONE    IR 10 milliGRAM(s) Oral every 4 hours PRN  oxyCODONE    IR 5 milliGRAM(s) Oral every 4 hours PRN  povidone iodine 10% Swab 1 Application(s) Topical once      Objective:  Vital Signs Last 24 Hrs  T(C): 37.2 (14 Nov 2019 06:00), Max: 37.6 (13 Nov 2019 20:26)  T(F): 99 (14 Nov 2019 06:00), Max: 99.7 (13 Nov 2019 20:26)  HR: 81 (14 Nov 2019 06:00) (77 - 89)  BP: 119/64 (14 Nov 2019 06:00) (116/65 - 125/68)  BP(mean): --  RR: 13 (14 Nov 2019 06:00) (13 - 17)  SpO2: 99% (14 Nov 2019 06:00) (95% - 99%)    PHYSICAL EXAM:  Constitutional:Well-developed, well nourishe  Eyes:DILCIA, EOMI  Ear/Nose/Throat: no oral lesion, no sinus tenderness on percussion	  Neck:no JVD, no lymphadenopathy, supple  Respiratory: CTA bhargavi  Cardiovascular: S1S2 RRR, no murmurs  Gastrointestinal:soft, (+) BS, no HSM  Extremities:no e/e/c        LABS:                        6.6    6.74  )-----------( 235      ( 14 Nov 2019 07:22 )             19.6     11-14    135  |  101  |  12  ----------------------------<  121<H>  3.6   |  26  |  0.86    Ca    8.2<L>      14 Nov 2019 07:22              MICROBIOLOGY:        RADIOLOGY & ADDITIONAL STUDIES: Infectious diseases progress note:  MERYL PIZARRO is a 64yMale patient    HIP PAIN    Acute deep vein thrombosis (DVT) of femoral vein of left lower extremity  Anemia due to blood loss  Foot drop  Postoperative state  Preoperative clearance  Coronary artery disease involving native coronary artery of native heart without angina pectoris  Pyogenic arthritis of left hip, due to unspecified organism  Pure hypercholesterolemia  Essential hypertension    Subjective:  Patient denied fevers, chills, night sweats, chest pain, SOB, abdominal pain, back pain, n/v/d/c. Patient reports persistent L hip pain and lateral thigh pain, sharp, nonradiating. Also admits to left foot decreased sensation and left foot weakness that is unchanged from prior    Allergies    No Known Allergies    Intolerances        ANTIBIOTICS/RELEVANT:  antimicrobials  nafcillin  IVPB 2 Gram(s) IV Intermittent every 4 hours  rifAMPin 300 milliGRAM(s) Oral every 12 hours    immunologic:    OTHER:  acetaminophen   Tablet .. 650 milliGRAM(s) Oral every 6 hours  atorvastatin 80 milliGRAM(s) Oral at bedtime  BUpivacaine liposome 1.3% Injectable (no eMAR) 20 milliLiter(s) Local Injection once  carvedilol 12.5 milliGRAM(s) Oral every 12 hours  enoxaparin Injectable 110 milliGRAM(s) SubCutaneous every 12 hours  ferrous    sulfate 325 milliGRAM(s) Oral three times a day  HYDROmorphone  Injectable 0.5 milliGRAM(s) IV Push every 4 hours PRN  HYDROmorphone  Injectable 0.5 milliGRAM(s) IV Push every 15 minutes PRN  lactated ringers. 1000 milliLiter(s) IV Continuous <Continuous>  metoclopramide Injectable 10 milliGRAM(s) IV Push every 6 hours PRN  oxyCODONE    IR 10 milliGRAM(s) Oral every 4 hours PRN  oxyCODONE    IR 5 milliGRAM(s) Oral every 4 hours PRN  povidone iodine 10% Swab 1 Application(s) Topical once      Objective:  Vital Signs Last 24 Hrs  T(C): 37.2 (14 Nov 2019 06:00), Max: 37.6 (13 Nov 2019 20:26)  T(F): 99 (14 Nov 2019 06:00), Max: 99.7 (13 Nov 2019 20:26)  HR: 81 (14 Nov 2019 06:00) (77 - 89)  BP: 119/64 (14 Nov 2019 06:00) (116/65 - 125/68)  BP(mean): --  RR: 13 (14 Nov 2019 06:00) (13 - 17)  SpO2: 99% (14 Nov 2019 06:00) (95% - 99%)    PHYSICAL EXAM  Constitutional: WDWN resting comfortably in bed; NAD  HEENT:  NC/AT. clear conjunctiva, MMM  Neck: supple  Respiratory: CTA B/L  Cardiac: +S1/S2; RRR  Gastrointestinal: soft, NT/ND; no rebound or guarding; +BSx4  Extremities: left thigh with packed wound on lateral aspect, tender to palpation along incision site. No erythema, no edema, no cyanosis b/l  Neurologic: left foot decreased sensation. Diminished strength to dorsiflexion of left foot      LABS:                        6.6    6.74  )-----------( 235      ( 14 Nov 2019 07:22 )             19.6     11-14    135  |  101  |  12  ----------------------------<  121<H>  3.6   |  26  |  0.86    Ca    8.2<L>      14 Nov 2019 07:22              MICROBIOLOGY:        RADIOLOGY & ADDITIONAL STUDIES:

## 2019-11-14 NOTE — PROGRESS NOTE ADULT - SUBJECTIVE AND OBJECTIVE BOX
Interval Events: Reviewed  Patient seen and examined at bedside.    Patient is a 64y old  Male who presents with a chief complaint of L total hip infection (14 Nov 2019 15:01)    pain in the left hip and concerned about the drop foot  PAST MEDICAL & SURGICAL HISTORY:  Hypertension  Hyperlipidemia      MEDICATIONS:  Pulmonary:    Antimicrobials:  nafcillin  IVPB 2 Gram(s) IV Intermittent every 4 hours  rifAMPin 300 milliGRAM(s) Oral every 12 hours    Anticoagulants:  enoxaparin Injectable 110 milliGRAM(s) SubCutaneous every 12 hours    Cardiac:  carvedilol 12.5 milliGRAM(s) Oral every 12 hours      Allergies    No Known Allergies    Intolerances        Vital Signs Last 24 Hrs  T(C): 37.1 (14 Nov 2019 20:13), Max: 37.2 (14 Nov 2019 06:00)  T(F): 98.8 (14 Nov 2019 20:13), Max: 99 (14 Nov 2019 06:00)  HR: 78 (14 Nov 2019 20:13) (78 - 83)  BP: 129/67 (14 Nov 2019 20:13) (118/64 - 162/70)  BP(mean): --  RR: 17 (14 Nov 2019 20:13) (13 - 17)  SpO2: 97% (14 Nov 2019 20:13) (96% - 99%)    11-13 @ 07:01 - 11-14 @ 07:00  --------------------------------------------------------  IN: 240 mL / OUT: 1225 mL / NET: -985 mL    11-14 @ 07:01  -  11-14 @ 21:15  --------------------------------------------------------  IN: 840 mL / OUT: 890 mL / NET: -50 mL          Review of Systems:   •	General: negative  •	Skin/Breast: negative  •	Ophthalmologic: negative  •	ENMT: negative  •	Respiratory and Thorax: negative  •	Cardiovascular: negative  •	Gastrointestinal: negative  •	Genitourinary: negative  •	Musculoskeletal: negative  •	Neurological: negative  •	Psychiatric: negative  •	Hematology/Lymphatics: negative  •	Endocrine: negative  •	Allergic/Immunologic: negative    Physical Exam:   • Constitutional:	Well-developed, well nourished  • Eyes:	EOMI; PERRL; no drainage or redness  • ENMT:	No oral lesions; no gross abnormalities  • Neck	No bruits; no thyromegaly or nodules  • Breasts:	not examined  • Back:	No deformity or limitation of movement  • Respiratory:	Breath Sounds equal & clear to percussion & auscultation, no accessory muscle use  • Cardiovascular:	Regular rate & rhythm, normal S1, S2; no murmurs, gallops or rubs; no S3, S4  • Gastrointestinal:	Soft, non-tender, no hepatosplenomegaly, normal bowel sounds  • Genitourinary:	not examined  • Rectal: not examined  • Extremities:	No cyanosis, clubbing or edema  • Vascular:	Equal and normal pulses (carotid, femoral, dorsalis pedis)  • Neurologica:l	not examined  • Skin:	No lesions; no rash  • Lymph Nodes:	No lymphadedenopathy  • Musculoskeletal:	No joint pain, swelling or deformity; no limitation of movement        LABS:      CBC Full  -  ( 14 Nov 2019 18:52 )  WBC Count : 8.21 K/uL  RBC Count : 2.64 M/uL  Hemoglobin : 8.0 g/dL  Hematocrit : 24.2 %  Platelet Count - Automated : 281 K/uL  Mean Cell Volume : 91.7 fl  Mean Cell Hemoglobin : 30.3 pg  Mean Cell Hemoglobin Concentration : 33.1 gm/dL  Auto Neutrophil # : x  Auto Lymphocyte # : x  Auto Monocyte # : x  Auto Eosinophil # : x  Auto Basophil # : x  Auto Neutrophil % : x  Auto Lymphocyte % : x  Auto Monocyte % : x  Auto Eosinophil % : x  Auto Basophil % : x    11-14    135  |  101  |  12  ----------------------------<  121<H>  3.6   |  26  |  0.86    Ca    8.2<L>      14 Nov 2019 07:22                          RADIOLOGY & ADDITIONAL STUDIES (The following images were personally reviewed):  Villa:                                     No  Urine output:                       adequate  DVT prophylaxis:                 Yes  Flattus:                                  Yes  Bowel movement:              No

## 2019-11-14 NOTE — CONSULT NOTE ADULT - SUBJECTIVE AND OBJECTIVE BOX
Vascular [facility] Service Consult Note    [facility]OBLEM [facility]:  Acute deep vein thrombosis (DVT) of femoral vein of left lower extremity: Acute deep vein thrombosis (DVT) of femoral vein of left lower extremity  Anemia due to blood loss: Anemia due to blood loss  Foot drop  Postoperative state: Postoperative state  Preoperative clearance: Preoperative clearance  Coronary artery disease involving native coronary artery of native heart without angina pectoris: Coronary artery disease involving native coronary artery of native heart without angina pectoris  Pyogenic arthritis of left hip, due to unspecified organism: Pyogenic arthritis of left hip, due to unspecified organism  Pure hypercholesterolemia: Pure hypercholesterolemia  Essential hypertension: Essential hypertension             Diagnosis: hip infection    Indications for Vascular Access (Check all that apply)  [X  ]  Antibiotic Therapy       Antibiotic Prescribed: nafcillin                                                                            Expected Duration of Therapy:  6 weeks              [  ]  IV Hydration  [  ]  Total Parenteral Nutrition  [  ]  Chemotherapy  [  ]  Difficult Venous Access  [  ]  CVP monitoring  [  ]  Medications with high potential for tissue necrosis on extravasation  [  ]  Other    Screening (Check all that apply)  Previous Radiation to chest  [  ] Yes      [X  ]  No  Breast Cancer                          [  ] Left     [  ]  Right    [ X ]  No  Lymph Node Dissection         [  ] Left     [  ]  Right    [ X ]  No  Pacemaker or ICD                   [  ] Left     [  ]  Right    [ X ]  No  Upper Extremity DVT             [  ] Left     [  ]  Right    [ X ]  No  Chronic Kidney Disease         [  ]  Yes     [ X ]  No  Hemodialysis                           [  ]  Yes     [ X ]  No  AV Fistula/ Graft                     [  ]  Left    [  ]  Right    [  X]  No  Temp>101F in past 24 H       [  ]  Yes     [  X]  No  H/O PICC/Midline                   [ X ]  Yes     [  ]  No    Lab data:                        6.6    6.74  )-----------( 235      ( 14 Nov 2019 07:22 )             19.6     11-14    135  |  101  |  12  ----------------------------<  121<H>  3.6   |  26  |  0.86    Ca    8.2<L>      14 Nov 2019 07:22                I have reviewed the chart, interviewed and examined the patient and determined that this patient:  [X  ] Is a candidate for a PICC line  [  ] Is a candidate for a Midline  [  ] Is not a candidate for vascular access device (reason)    Lumens:    [ X ] Single  [  ] Double

## 2019-11-14 NOTE — PROGRESS NOTE ADULT - ASSESSMENT
63 yo M with HTN, HLD, CAD s/p stents, B THR with PPJI L hip, initial onset early after surgery in 4/2019, failed DAIR/prolonged IV antibiotics, now s/p explantation and spacer placement, course c/b L foot drop and femoral DVT.  OR cultures are all growing MSSA.  Spacer has metal.  Suggest:  - F/U blood cultures from 11/10   - recommend transition of Lipitor to PO Crestor 40mg daily 2/2 interaction of rifampin with Lipitor  - Continue nafcillin 2 g IV q4h for 6 week course since time of surgery (Last dose 11/25/19)  - Continue rifampin 300 mg po q12h for 6 week course since time of surgery (Last dose 11/25/19)  - Patient will require weekly CBC, CMP, ESR, CRP. Results can be faxed to Dr. Schreiber's office    Tentative plans discussed with Dr. Schreiber 65 yo M with HTN, HLD, CAD s/p stents, B THR with PPJI L hip, initial onset early after surgery in 4/2019, failed DAIR/prolonged IV antibiotics, now s/p explantation and spacer placement, course c/b L foot drop and femoral DVT.  OR cultures are all growing MSSA.  Spacer has metal.  Suggest:  - F/U blood cultures from 11/10   - recommend transition of Lipitor to PO Crestor 40mg daily 2/2 interaction of rifampin with Lipitor  - Continue nafcillin 2 g IV q4h for 6 week course since time of surgery (Last dose 12/23/19)  - Continue rifampin 300 mg po q12h for 6 week course since time of surgery (Last dose 12/23/19)  - Patient will require weekly CBC, CMP, ESR, CRP. Results can be faxed to Dr. Schreiber's office    Tentative plans discussed with Dr. Schreiber 65 yo M with HTN, HLD, CAD s/p stents, B THR with PPJI L hip, initial onset early after surgery in 4/2019, failed DAIR/prolonged IV antibiotics, now s/p explantation and spacer placement, course c/b L foot drop and femoral DVT.  OR cultures are all growing MSSA.  Spacer has metal.  Suggest:  - F/U blood cultures from 11/10   - recommend transition of Lipitor to PO Crestor 40mg daily 2/2 interaction of rifampin with Lipitor  - Continue nafcillin 2 g IV q4h for 6 week course since time of surgery (Last dose 12/23/19)  - Continue rifampin 300 mg po q12h for 6 week course since time of surgery (Last dose 12/23/19)  - Patient will require weekly CBC, CMP, ESR, CRP. Results can be faxed to Dr. Schreiber's office  - outpatient ID follow up to be arranged by Dr. Schreiber  - ID will sign off. Please re-consult for any additional questions or concerns.     Plan discussed with Dr. Schreiber

## 2019-11-15 LAB
ANION GAP SERPL CALC-SCNC: 10 MMOL/L — SIGNIFICANT CHANGE UP (ref 5–17)
BUN SERPL-MCNC: 9 MG/DL — SIGNIFICANT CHANGE UP (ref 7–23)
CALCIUM SERPL-MCNC: 8.2 MG/DL — LOW (ref 8.4–10.5)
CHLORIDE SERPL-SCNC: 102 MMOL/L — SIGNIFICANT CHANGE UP (ref 96–108)
CO2 SERPL-SCNC: 26 MMOL/L — SIGNIFICANT CHANGE UP (ref 22–31)
CREAT SERPL-MCNC: 0.83 MG/DL — SIGNIFICANT CHANGE UP (ref 0.5–1.3)
CRP SERPL-MCNC: 7.29 MG/DL — HIGH (ref 0–0.4)
CULTURE RESULTS: SIGNIFICANT CHANGE UP
CULTURE RESULTS: SIGNIFICANT CHANGE UP
ERYTHROCYTE [SEDIMENTATION RATE] IN BLOOD: 80 MM/HR — HIGH
GLUCOSE SERPL-MCNC: 120 MG/DL — HIGH (ref 70–99)
HCT VFR BLD CALC: 25.2 % — LOW (ref 39–50)
HGB BLD-MCNC: 8.3 G/DL — LOW (ref 13–17)
MCHC RBC-ENTMCNC: 30.1 PG — SIGNIFICANT CHANGE UP (ref 27–34)
MCHC RBC-ENTMCNC: 32.9 GM/DL — SIGNIFICANT CHANGE UP (ref 32–36)
MCV RBC AUTO: 91.3 FL — SIGNIFICANT CHANGE UP (ref 80–100)
NRBC # BLD: 0 /100 WBCS — SIGNIFICANT CHANGE UP (ref 0–0)
PLATELET # BLD AUTO: 348 K/UL — SIGNIFICANT CHANGE UP (ref 150–400)
POTASSIUM SERPL-MCNC: 3.5 MMOL/L — SIGNIFICANT CHANGE UP (ref 3.5–5.3)
POTASSIUM SERPL-SCNC: 3.5 MMOL/L — SIGNIFICANT CHANGE UP (ref 3.5–5.3)
RBC # BLD: 2.76 M/UL — LOW (ref 4.2–5.8)
RBC # FLD: 12.7 % — SIGNIFICANT CHANGE UP (ref 10.3–14.5)
SODIUM SERPL-SCNC: 138 MMOL/L — SIGNIFICANT CHANGE UP (ref 135–145)
SPECIMEN SOURCE: SIGNIFICANT CHANGE UP
SPECIMEN SOURCE: SIGNIFICANT CHANGE UP
SURGICAL PATHOLOGY STUDY: SIGNIFICANT CHANGE UP
WBC # BLD: 7.48 K/UL — SIGNIFICANT CHANGE UP (ref 3.8–10.5)
WBC # FLD AUTO: 7.48 K/UL — SIGNIFICANT CHANGE UP (ref 3.8–10.5)

## 2019-11-15 PROCEDURE — 76937 US GUIDE VASCULAR ACCESS: CPT | Mod: 26,59

## 2019-11-15 PROCEDURE — 99232 SBSQ HOSP IP/OBS MODERATE 35: CPT | Mod: GC

## 2019-11-15 PROCEDURE — 36569 INSJ PICC 5 YR+ W/O IMAGING: CPT

## 2019-11-15 RX ORDER — OXYCODONE HYDROCHLORIDE 5 MG/1
10 TABLET ORAL EVERY 4 HOURS
Refills: 0 | Status: DISCONTINUED | OUTPATIENT
Start: 2019-11-15 | End: 2019-11-19

## 2019-11-15 RX ORDER — SODIUM CHLORIDE 9 MG/ML
10 INJECTION INTRAMUSCULAR; INTRAVENOUS; SUBCUTANEOUS
Refills: 0 | Status: DISCONTINUED | OUTPATIENT
Start: 2019-11-15 | End: 2019-11-19

## 2019-11-15 RX ORDER — HYDROMORPHONE HYDROCHLORIDE 2 MG/ML
0.5 INJECTION INTRAMUSCULAR; INTRAVENOUS; SUBCUTANEOUS EVERY 4 HOURS
Refills: 0 | Status: DISCONTINUED | OUTPATIENT
Start: 2019-11-15 | End: 2019-11-19

## 2019-11-15 RX ORDER — CHLORHEXIDINE GLUCONATE 213 G/1000ML
1 SOLUTION TOPICAL
Refills: 0 | Status: DISCONTINUED | OUTPATIENT
Start: 2019-11-15 | End: 2019-11-19

## 2019-11-15 RX ORDER — OXYCODONE HYDROCHLORIDE 5 MG/1
5 TABLET ORAL EVERY 4 HOURS
Refills: 0 | Status: DISCONTINUED | OUTPATIENT
Start: 2019-11-15 | End: 2019-11-19

## 2019-11-15 RX ORDER — OXYCODONE HYDROCHLORIDE 5 MG/1
1 TABLET ORAL
Qty: 20 | Refills: 0
Start: 2019-11-15

## 2019-11-15 RX ADMIN — Medication 325 MILLIGRAM(S): at 21:19

## 2019-11-15 RX ADMIN — NAFCILLIN 200 GRAM(S): 10 INJECTION, POWDER, FOR SOLUTION INTRAVENOUS at 19:07

## 2019-11-15 RX ADMIN — Medication 650 MILLIGRAM(S): at 23:07

## 2019-11-15 RX ADMIN — Medication 650 MILLIGRAM(S): at 20:00

## 2019-11-15 RX ADMIN — CHLORHEXIDINE GLUCONATE 1 APPLICATION(S): 213 SOLUTION TOPICAL at 10:53

## 2019-11-15 RX ADMIN — OXYCODONE HYDROCHLORIDE 10 MILLIGRAM(S): 5 TABLET ORAL at 14:26

## 2019-11-15 RX ADMIN — ATORVASTATIN CALCIUM 80 MILLIGRAM(S): 80 TABLET, FILM COATED ORAL at 21:19

## 2019-11-15 RX ADMIN — OXYCODONE HYDROCHLORIDE 10 MILLIGRAM(S): 5 TABLET ORAL at 23:07

## 2019-11-15 RX ADMIN — CARVEDILOL PHOSPHATE 12.5 MILLIGRAM(S): 80 CAPSULE, EXTENDED RELEASE ORAL at 05:51

## 2019-11-15 RX ADMIN — NAFCILLIN 200 GRAM(S): 10 INJECTION, POWDER, FOR SOLUTION INTRAVENOUS at 23:07

## 2019-11-15 RX ADMIN — NAFCILLIN 200 GRAM(S): 10 INJECTION, POWDER, FOR SOLUTION INTRAVENOUS at 10:53

## 2019-11-15 RX ADMIN — SENNA PLUS 2 TABLET(S): 8.6 TABLET ORAL at 21:20

## 2019-11-15 RX ADMIN — NAFCILLIN 200 GRAM(S): 10 INJECTION, POWDER, FOR SOLUTION INTRAVENOUS at 02:00

## 2019-11-15 RX ADMIN — OXYCODONE HYDROCHLORIDE 10 MILLIGRAM(S): 5 TABLET ORAL at 15:00

## 2019-11-15 RX ADMIN — OXYCODONE HYDROCHLORIDE 10 MILLIGRAM(S): 5 TABLET ORAL at 02:33

## 2019-11-15 RX ADMIN — ENOXAPARIN SODIUM 110 MILLIGRAM(S): 100 INJECTION SUBCUTANEOUS at 23:06

## 2019-11-15 RX ADMIN — ENOXAPARIN SODIUM 110 MILLIGRAM(S): 100 INJECTION SUBCUTANEOUS at 10:57

## 2019-11-15 RX ADMIN — OXYCODONE HYDROCHLORIDE 10 MILLIGRAM(S): 5 TABLET ORAL at 06:30

## 2019-11-15 RX ADMIN — CARVEDILOL PHOSPHATE 12.5 MILLIGRAM(S): 80 CAPSULE, EXTENDED RELEASE ORAL at 13:36

## 2019-11-15 RX ADMIN — Medication 650 MILLIGRAM(S): at 14:00

## 2019-11-15 RX ADMIN — NAFCILLIN 200 GRAM(S): 10 INJECTION, POWDER, FOR SOLUTION INTRAVENOUS at 05:51

## 2019-11-15 RX ADMIN — Medication 650 MILLIGRAM(S): at 19:08

## 2019-11-15 RX ADMIN — Medication 650 MILLIGRAM(S): at 13:37

## 2019-11-15 RX ADMIN — OXYCODONE HYDROCHLORIDE 10 MILLIGRAM(S): 5 TABLET ORAL at 05:51

## 2019-11-15 RX ADMIN — NAFCILLIN 200 GRAM(S): 10 INJECTION, POWDER, FOR SOLUTION INTRAVENOUS at 13:37

## 2019-11-15 RX ADMIN — OXYCODONE HYDROCHLORIDE 10 MILLIGRAM(S): 5 TABLET ORAL at 19:12

## 2019-11-15 RX ADMIN — Medication 325 MILLIGRAM(S): at 05:51

## 2019-11-15 RX ADMIN — OXYCODONE HYDROCHLORIDE 10 MILLIGRAM(S): 5 TABLET ORAL at 20:00

## 2019-11-15 RX ADMIN — Medication 650 MILLIGRAM(S): at 23:56

## 2019-11-15 RX ADMIN — Medication 325 MILLIGRAM(S): at 13:36

## 2019-11-15 RX ADMIN — OXYCODONE HYDROCHLORIDE 10 MILLIGRAM(S): 5 TABLET ORAL at 01:33

## 2019-11-15 NOTE — PROGRESS NOTE ADULT - SUBJECTIVE AND OBJECTIVE BOX
Interval Events: Reviewed  Patient seen and examined at bedside.    Patient is a 64y old  Male who presents with a chief complaint of L total hip infection (15 Nov 2019 08:50)    he is better  PAST MEDICAL & SURGICAL HISTORY:  Hypertension  Hyperlipidemia      MEDICATIONS:  Pulmonary:    Antimicrobials:  nafcillin  IVPB 2 Gram(s) IV Intermittent every 4 hours  rifAMPin 300 milliGRAM(s) Oral every 12 hours    Anticoagulants:  enoxaparin Injectable 110 milliGRAM(s) SubCutaneous every 12 hours    Cardiac:  carvedilol 12.5 milliGRAM(s) Oral every 12 hours      Allergies    No Known Allergies    Intolerances        Vital Signs Last 24 Hrs  T(C): 37.1 (15 Nov 2019 15:37), Max: 37.1 (14 Nov 2019 20:13)  T(F): 98.7 (15 Nov 2019 15:37), Max: 98.8 (14 Nov 2019 20:13)  HR: 76 (15 Nov 2019 15:37) (76 - 82)  BP: 118/67 (15 Nov 2019 15:37) (118/67 - 129/67)  BP(mean): --  RR: 16 (15 Nov 2019 15:37) (16 - 17)  SpO2: 96% (15 Nov 2019 15:37) (96% - 98%)    11-14 @ 07:01  -  11-15 @ 07:00  --------------------------------------------------------  IN: 840 mL / OUT: 970 mL / NET: -130 mL    11-15 @ 07:01  -  11-15 @ 18:22  --------------------------------------------------------  IN: 480 mL / OUT: 505 mL / NET: -25 mL          Review of Systems:   •	General: negative  •	Skin/Breast: negative  •	Ophthalmologic: negative  •	ENMT: negative  •	Respiratory and Thorax: negative  •	Cardiovascular: negative  •	Gastrointestinal: negative  •	Genitourinary: negative  •	Musculoskeletal: negative  •	Neurological: negative  •	Psychiatric: negative  •	Hematology/Lymphatics: negative  •	Endocrine: negative  •	Allergic/Immunologic: negative    Physical Exam:   • Constitutional:	Well-developed, well nourished  • Eyes:	EOMI; PERRL; no drainage or redness  • ENMT:	No oral lesions; no gross abnormalities  • Neck	No bruits; no thyromegaly or nodules  • Breasts:	not examined  • Back:	No deformity or limitation of movement  • Respiratory:	Breath Sounds equal & clear to percussion & auscultation, no accessory muscle use  • Cardiovascular:	Regular rate & rhythm, normal S1, S2; no murmurs, gallops or rubs; no S3, S4  • Gastrointestinal:	Soft, non-tender, no hepatosplenomegaly, normal bowel sounds  • Genitourinary:	not examined  • Rectal: not examined  • Extremities:	No cyanosis, clubbing or edema  • Vascular:	Equal and normal pulses (carotid, femoral, dorsalis pedis)  • Neurologica:l	not examined  • Skin:	No lesions; no rash  • Lymph Nodes:	No lymphadedenopathy  • Musculoskeletal:	No joint pain, swelling or deformity; no limitation of movement        LABS:      CBC Full  -  ( 15 Nov 2019 07:46 )  WBC Count : 7.48 K/uL  RBC Count : 2.76 M/uL  Hemoglobin : 8.3 g/dL  Hematocrit : 25.2 %  Platelet Count - Automated : 348 K/uL  Mean Cell Volume : 91.3 fl  Mean Cell Hemoglobin : 30.1 pg  Mean Cell Hemoglobin Concentration : 32.9 gm/dL  Auto Neutrophil # : x  Auto Lymphocyte # : x  Auto Monocyte # : x  Auto Eosinophil # : x  Auto Basophil # : x  Auto Neutrophil % : x  Auto Lymphocyte % : x  Auto Monocyte % : x  Auto Eosinophil % : x  Auto Basophil % : x    11-15    138  |  102  |  9   ----------------------------<  120<H>  3.5   |  26  |  0.83    Ca    8.2<L>      15 Nov 2019 07:46                          RADIOLOGY & ADDITIONAL STUDIES (The following images were personally reviewed):  Villa:                                     No  Urine output:                       adequate  DVT prophylaxis:                 Yes  Flattus:                                  Yes  Bowel movement:              No

## 2019-11-15 NOTE — PROGRESS NOTE ADULT - PROBLEM SELECTOR PLAN 6
Monitor hemoglobin. Hold transfusion unless hemoglobin is 7, 8 in patient with coronary artery disease, hemodynamic instability such as tachycardia/hypotension, or there is evidence of acute blood loss.  Anemia is due to postoperative blood loss  Transfuse and monitor drains as the patient is on Lovenox  He was transfused as Hb decreased.  If Hb decrease further then will hold Lovenox and follow with CT scan abdomen and may need IVC filter  Hb is stable and no indication for transfusion

## 2019-11-15 NOTE — PROGRESS NOTE ADULT - SUBJECTIVE AND OBJECTIVE BOX
Ortho Note    Pt seen and examined seated in chair. Comfortable without complaints, pain controlled.  Denies CP, SOB, N/V, numbness/tingling     Vital Signs Last 24 Hrs  T(C): 36.9 (11-15-19 @ 08:34), Max: 36.9 (11-15-19 @ 08:34)  T(F): 98.5 (11-15-19 @ 08:34), Max: 98.5 (11-15-19 @ 08:34)  HR: 82 (11-15-19 @ 08:34) (82 - 82)  BP: 126/60 (11-15-19 @ 08:34) (126/60 - 126/60)  BP(mean): --  RR: 16 (11-15-19 @ 08:34) (16 - 16)  SpO2: 98% (11-15-19 @ 08:34) (98% - 98%)  AVSS    focused exam:  General: Pt Alert and oriented, NAD  DSG- prevena CDI, hemovac x 1 in place  Pulses: +2DP, WWP feet  Sensation: sensation diminished over dorsal aspect of L foot, otherwise SILT intact in bilateral lower extremities  Motor:  0/5 EHL/TA LLE, 5/5 EHL/TA RLE, 5/5 EHL/FHL/TA/GS bilaterally                          8.3    7.48  )-----------( 348      ( 15 Nov 2019 07:46 )             25.2   15 Nov 2019 07:46    138    |  102    |  9      ----------------------------<  120    3.5     |  26     |  0.83     Ca    8.2        15 Nov 2019 07:46      A/P :  Pt is a 63yo Male s/p  L FRANCINE explant, antibiotic spacer 11/11, postop footdrop 11/11, postop L femoral vein thrombus 11/13  - H+H improving today, s/p 1unit PRBCs, 11/11, 11/13, 11/14. Asymptomatic. Hemovac output decreasing; continue to monitor H+H.   - hemovac decreasing- likely can be removed this afternoon  - Pain Control- continue current regimen, pain well-controlled  - DVT ppx: lovenox 110mg bid; can be discharged home on apixaban as per medicine attending recommendation  - PT, WBS: 50% PWB LLE  - post-op foot drop- r/o spinal etiology, pending MRI; continue AFO braces  - Patient  refusing AFO braces because don't fit well, orthotic company contacted to resize patient today  - ID final recs- nafcillin 2g q4h, rifmampin 300mg q12h x 6 weeks (last date of administration: 12/23/19), blood cultures NGTD; PICC line today  - continue bowel regimen  - OOB for meals, I/S  - dispo: home with [facility] and home services    Ortho Pager [phone]

## 2019-11-15 NOTE — PROCEDURE NOTE - NSPROCDETAILS_GEN_ALL_CORE
sterile technique, catheter placed/sterile dressing applied/location identified, draped/prepped, sterile technique used/ultrasound assessment

## 2019-11-15 NOTE — PROGRESS NOTE ADULT - SUBJECTIVE AND OBJECTIVE BOX
SUBJECTIVE: Patient seen and examined.      OBJECTIVE:  NAD  Vital Signs Last 24 Hrs  T(C): 37.1 (14 Nov 2019 20:13), Max: 37.1 (14 Nov 2019 20:13)  T(F): 98.8 (14 Nov 2019 20:13), Max: 98.8 (14 Nov 2019 20:13)  HR: 78 (14 Nov 2019 20:13) (78 - 83)  BP: 129/67 (14 Nov 2019 20:13) (118/64 - 162/70)  BP(mean): --  RR: 17 (14 Nov 2019 20:13) (17 - 17)  SpO2: 97% (14 Nov 2019 20:13) (96% - 99%)    General: Pt Alert and oriented, NAD; HV x1 holding suction  LLE: Prevena DSG C/D/I holding suction  Pulses: DP pulse 2+; Cap refill < 2 sec  Sensation: Diminished sensation over plantar and dorsal surfaces of the foot, SILT and symmetric to contralateral extremity  Motor: FHL/GS firing and symmetric to contralateral extremity; EHL/TA not firing,                         8.3    7.48  )-----------( 348      ( 15 Nov 2019 07:46 )             25.2                   A/P :  Pt is a 63yo Male s/p  L FRANCINE explant, antibiotic spacer 11/11, postop footdrop 11/11, postop L femoral vein thrombus 11/13  -    Pain control  -    DVT ppx: SCD/LVX  -    standing abx  -    f/u cultures -staph  -    f/u drain output  -    appreciate ID recs, vacular recs, medicine recs  -    f/u LLE exam     -    Weight bearing status: 50% PWB LLE  -    Physical Therapy/OT  -    Dispo: Home w.hospitals    Ritesh Huitron MD  Senior Orthopaedic Resident  Orthopaedic Surgery

## 2019-11-16 LAB
ANION GAP SERPL CALC-SCNC: 10 MMOL/L — SIGNIFICANT CHANGE UP (ref 5–17)
BUN SERPL-MCNC: 9 MG/DL — SIGNIFICANT CHANGE UP (ref 7–23)
CALCIUM SERPL-MCNC: 8.2 MG/DL — LOW (ref 8.4–10.5)
CHLORIDE SERPL-SCNC: 105 MMOL/L — SIGNIFICANT CHANGE UP (ref 96–108)
CO2 SERPL-SCNC: 25 MMOL/L — SIGNIFICANT CHANGE UP (ref 22–31)
CREAT SERPL-MCNC: 0.8 MG/DL — SIGNIFICANT CHANGE UP (ref 0.5–1.3)
CRP SERPL-MCNC: 5.35 MG/DL — HIGH (ref 0–0.4)
ERYTHROCYTE [SEDIMENTATION RATE] IN BLOOD: 93 MM/HR — HIGH
GLUCOSE SERPL-MCNC: 118 MG/DL — HIGH (ref 70–99)
HCT VFR BLD CALC: 23 % — LOW (ref 39–50)
HGB BLD-MCNC: 7.6 G/DL — LOW (ref 13–17)
MCHC RBC-ENTMCNC: 30 PG — SIGNIFICANT CHANGE UP (ref 27–34)
MCHC RBC-ENTMCNC: 33 GM/DL — SIGNIFICANT CHANGE UP (ref 32–36)
MCV RBC AUTO: 90.9 FL — SIGNIFICANT CHANGE UP (ref 80–100)
NRBC # BLD: 0 /100 WBCS — SIGNIFICANT CHANGE UP (ref 0–0)
PLATELET # BLD AUTO: 334 K/UL — SIGNIFICANT CHANGE UP (ref 150–400)
POTASSIUM SERPL-MCNC: 3.5 MMOL/L — SIGNIFICANT CHANGE UP (ref 3.5–5.3)
POTASSIUM SERPL-SCNC: 3.5 MMOL/L — SIGNIFICANT CHANGE UP (ref 3.5–5.3)
RBC # BLD: 2.53 M/UL — LOW (ref 4.2–5.8)
RBC # FLD: 13.2 % — SIGNIFICANT CHANGE UP (ref 10.3–14.5)
SODIUM SERPL-SCNC: 140 MMOL/L — SIGNIFICANT CHANGE UP (ref 135–145)
WBC # BLD: 5.86 K/UL — SIGNIFICANT CHANGE UP (ref 3.8–10.5)
WBC # FLD AUTO: 5.86 K/UL — SIGNIFICANT CHANGE UP (ref 3.8–10.5)

## 2019-11-16 PROCEDURE — 73721 MRI JNT OF LWR EXTRE W/O DYE: CPT | Mod: 26,LT

## 2019-11-16 PROCEDURE — 72148 MRI LUMBAR SPINE W/O DYE: CPT | Mod: 26

## 2019-11-16 PROCEDURE — 99232 SBSQ HOSP IP/OBS MODERATE 35: CPT | Mod: GC

## 2019-11-16 RX ORDER — POTASSIUM CHLORIDE 20 MEQ
40 PACKET (EA) ORAL EVERY 4 HOURS
Refills: 0 | Status: COMPLETED | OUTPATIENT
Start: 2019-11-16 | End: 2019-11-16

## 2019-11-16 RX ADMIN — NAFCILLIN 200 GRAM(S): 10 INJECTION, POWDER, FOR SOLUTION INTRAVENOUS at 14:38

## 2019-11-16 RX ADMIN — OXYCODONE HYDROCHLORIDE 10 MILLIGRAM(S): 5 TABLET ORAL at 21:22

## 2019-11-16 RX ADMIN — OXYCODONE HYDROCHLORIDE 10 MILLIGRAM(S): 5 TABLET ORAL at 15:45

## 2019-11-16 RX ADMIN — OXYCODONE HYDROCHLORIDE 10 MILLIGRAM(S): 5 TABLET ORAL at 00:00

## 2019-11-16 RX ADMIN — NAFCILLIN 200 GRAM(S): 10 INJECTION, POWDER, FOR SOLUTION INTRAVENOUS at 11:00

## 2019-11-16 RX ADMIN — OXYCODONE HYDROCHLORIDE 10 MILLIGRAM(S): 5 TABLET ORAL at 15:00

## 2019-11-16 RX ADMIN — OXYCODONE HYDROCHLORIDE 10 MILLIGRAM(S): 5 TABLET ORAL at 10:57

## 2019-11-16 RX ADMIN — Medication 650 MILLIGRAM(S): at 06:36

## 2019-11-16 RX ADMIN — NAFCILLIN 200 GRAM(S): 10 INJECTION, POWDER, FOR SOLUTION INTRAVENOUS at 22:59

## 2019-11-16 RX ADMIN — Medication 650 MILLIGRAM(S): at 15:00

## 2019-11-16 RX ADMIN — OXYCODONE HYDROCHLORIDE 10 MILLIGRAM(S): 5 TABLET ORAL at 11:30

## 2019-11-16 RX ADMIN — NAFCILLIN 200 GRAM(S): 10 INJECTION, POWDER, FOR SOLUTION INTRAVENOUS at 02:24

## 2019-11-16 RX ADMIN — Medication 650 MILLIGRAM(S): at 22:21

## 2019-11-16 RX ADMIN — Medication 325 MILLIGRAM(S): at 05:36

## 2019-11-16 RX ADMIN — Medication 40 MILLIEQUIVALENT(S): at 17:57

## 2019-11-16 RX ADMIN — NAFCILLIN 200 GRAM(S): 10 INJECTION, POWDER, FOR SOLUTION INTRAVENOUS at 18:40

## 2019-11-16 RX ADMIN — OXYCODONE HYDROCHLORIDE 10 MILLIGRAM(S): 5 TABLET ORAL at 06:36

## 2019-11-16 RX ADMIN — ATORVASTATIN CALCIUM 80 MILLIGRAM(S): 80 TABLET, FILM COATED ORAL at 21:22

## 2019-11-16 RX ADMIN — Medication 325 MILLIGRAM(S): at 14:34

## 2019-11-16 RX ADMIN — OXYCODONE HYDROCHLORIDE 10 MILLIGRAM(S): 5 TABLET ORAL at 05:36

## 2019-11-16 RX ADMIN — ENOXAPARIN SODIUM 110 MILLIGRAM(S): 100 INJECTION SUBCUTANEOUS at 14:34

## 2019-11-16 RX ADMIN — Medication 650 MILLIGRAM(S): at 21:21

## 2019-11-16 RX ADMIN — Medication 650 MILLIGRAM(S): at 14:33

## 2019-11-16 RX ADMIN — CARVEDILOL PHOSPHATE 12.5 MILLIGRAM(S): 80 CAPSULE, EXTENDED RELEASE ORAL at 02:24

## 2019-11-16 RX ADMIN — OXYCODONE HYDROCHLORIDE 10 MILLIGRAM(S): 5 TABLET ORAL at 22:22

## 2019-11-16 RX ADMIN — SENNA PLUS 2 TABLET(S): 8.6 TABLET ORAL at 21:22

## 2019-11-16 RX ADMIN — Medication 325 MILLIGRAM(S): at 21:22

## 2019-11-16 RX ADMIN — Medication 650 MILLIGRAM(S): at 05:36

## 2019-11-16 RX ADMIN — NAFCILLIN 200 GRAM(S): 10 INJECTION, POWDER, FOR SOLUTION INTRAVENOUS at 07:04

## 2019-11-16 RX ADMIN — POLYETHYLENE GLYCOL 3350 17 GRAM(S): 17 POWDER, FOR SOLUTION ORAL at 14:34

## 2019-11-16 RX ADMIN — CARVEDILOL PHOSPHATE 12.5 MILLIGRAM(S): 80 CAPSULE, EXTENDED RELEASE ORAL at 14:34

## 2019-11-16 RX ADMIN — ENOXAPARIN SODIUM 110 MILLIGRAM(S): 100 INJECTION SUBCUTANEOUS at 22:59

## 2019-11-16 RX ADMIN — Medication 40 MILLIEQUIVALENT(S): at 14:33

## 2019-11-16 NOTE — PROGRESS NOTE ADULT - PROBLEM SELECTOR PLAN 7
continue anticoagulation. If Hb stable change to apixaban 10 mg bid for one week then 4 mg bid 3 month or until the after the next surgery

## 2019-11-16 NOTE — PROGRESS NOTE ADULT - PROBLEM SELECTOR PLAN 3
still growing staph aureus and on Nafcillin and rifampin.  Blood cultures negatve.  PICC site stable

## 2019-11-16 NOTE — PROGRESS NOTE ADULT - PROBLEM SELECTOR PLAN 6
Monitor hemoglobin. Hold transfusion unless hemoglobin is 7, 8 in patient with coronary artery disease, hemodynamic instability such as tachycardia/hypotension, or there is evidence of acute blood loss.  Anemia is due to postoperative blood loss  Transfuse and monitor drains as the patient is on Lovenox  He was transfused as Hb decreased.  If Hb decrease further then will hold Lovenox and follow with CT scan abdomen and may need IVC filter  Hb is stable and no indication for transfusion  await repeat Hb

## 2019-11-16 NOTE — PROGRESS NOTE ADULT - ASSESSMENT
A/P: 64yMale s/p L FRANCINE explant w revision to antibiotic spacer on 11/11 c/b postop foot drop and L femoral DVT  - Stable  - Pain/Nausea Control  - Home meds  - AM labs  - DVT ppx: SCDs and LVX  - WBS: 50% PWB LLE  - PT: home w home PT  - Cultures growing staph aureus -- on standing Nafcillin and PO rifampin  - PICC line placed yesterday  - Pending MRI L Spine and L Sciatic nerve    Ortho Pager [phone]

## 2019-11-16 NOTE — PROGRESS NOTE ADULT - SUBJECTIVE AND OBJECTIVE BOX
Interval Events: Reviewed  Patient seen and examined at bedside.    Patient is a 64y old  Male who presents with a chief complaint of L total hip infection (16 Nov 2019 09:44)  he is douign well and he ahd the MRI    PAST MEDICAL & SURGICAL HISTORY:  Hypertension  Hyperlipidemia      MEDICATIONS:  Pulmonary:    Antimicrobials:  nafcillin  IVPB 2 Gram(s) IV Intermittent every 4 hours  rifAMPin 300 milliGRAM(s) Oral every 12 hours    Anticoagulants:  enoxaparin Injectable 110 milliGRAM(s) SubCutaneous every 12 hours    Cardiac:  carvedilol 12.5 milliGRAM(s) Oral every 12 hours      Allergies    No Known Allergies    Intolerances        Vital Signs Last 24 Hrs  T(C): 37 (16 Nov 2019 17:05), Max: 37 (16 Nov 2019 17:05)  T(F): 98.6 (16 Nov 2019 17:05), Max: 98.6 (16 Nov 2019 17:05)  HR: 80 (16 Nov 2019 17:05) (76 - 80)  BP: 108/61 (16 Nov 2019 17:05) (108/61 - 126/83)  BP(mean): --  RR: 16 (16 Nov 2019 17:05) (15 - 18)  SpO2: 96% (16 Nov 2019 17:05) (96% - 98%)    11-15 @ 07:01  -  11-16 @ 07:00  --------------------------------------------------------  IN: 480 mL / OUT: 905 mL / NET: -425 mL    11-16 @ 07:01  -  11-16 @ 17:10  --------------------------------------------------------  IN: 240 mL / OUT: 400 mL / NET: -160 mL          Review of Systems:   •	General: negative  •	Skin/Breast: negative  •	Ophthalmologic: negative  •	ENMT: negative  •	Respiratory and Thorax: negative  •	Cardiovascular: negative  •	Gastrointestinal: negative  •	Genitourinary: negative  •	Musculoskeletal: negative  •	Neurological: negative  •	Psychiatric: negative  •	Hematology/Lymphatics: negative  •	Endocrine: negative  •	Allergic/Immunologic: negative    Physical Exam:   • Constitutional:	Well-developed, well nourished  • Eyes:	EOMI; PERRL; no drainage or redness  • ENMT:	No oral lesions; no gross abnormalities  • Neck	No bruits; no thyromegaly or nodules  • Breasts:	not examined  • Back:	No deformity or limitation of movement  • Respiratory:	Breath Sounds equal & clear to percussion & auscultation, no accessory muscle use  • Cardiovascular:	Regular rate & rhythm, normal S1, S2; no murmurs, gallops or rubs; no S3, S4  • Gastrointestinal:	Soft, non-tender, no hepatosplenomegaly, normal bowel sounds  • Genitourinary:	not examined  • Rectal: not examined  • Extremities:	No cyanosis, clubbing or edema  • Vascular:	Equal and normal pulses (carotid, femoral, dorsalis pedis)  • Neurologica:l	not examined  • Skin:	No lesions; no rash  • Lymph Nodes:	No lymphadedenopathy  • Musculoskeletal:	No joint pain, swelling or deformity; no limitation of movement        LABS:      CBC Full  -  ( 16 Nov 2019 07:47 )  WBC Count : 5.86 K/uL  RBC Count : 2.53 M/uL  Hemoglobin : 7.6 g/dL  Hematocrit : 23.0 %  Platelet Count - Automated : 334 K/uL  Mean Cell Volume : 90.9 fl  Mean Cell Hemoglobin : 30.0 pg  Mean Cell Hemoglobin Concentration : 33.0 gm/dL  Auto Neutrophil # : x  Auto Lymphocyte # : x  Auto Monocyte # : x  Auto Eosinophil # : x  Auto Basophil # : x  Auto Neutrophil % : x  Auto Lymphocyte % : x  Auto Monocyte % : x  Auto Eosinophil % : x  Auto Basophil % : x    11-16    140  |  105  |  9   ----------------------------<  118<H>  3.5   |  25  |  0.80    Ca    8.2<L>      16 Nov 2019 07:46            < from: MR Lumbar Spine No Cont (11.16.19 @ 13:42) >  EXAM:  MR SPINE LUMBAR                          PROCEDURE DATE:  11/16/2019          INTERPRETATION:  PROCEDURE: MRI of the lumbosacral spine without contrast    INDICATION: Left foot drop; rule out disc herniation    TECHNIQUE: Sagittal T1, T2, and STIR and axial T1 and T2 weighted images   of the lumbosacral spine were obtained.     COMPARISON: None    CORRELATION: Lumbar x-rays 10/31/2019    FINDINGS: The MRI exam demonstrates the lumbosacral alignment to be   intact. There is loss of intervertebral disc space height throughout   which is most pronounced at the L3/4 and L4/5 levels. There is disc   desiccation throughout. There are central endplate depressions at T12-L4   compatible with Schmorl's nodes. The vertebral body heights are   maintained. There is a well-circumscribed focus of hyperintense T1 and T2   signal at T12 which may represent a hemangioma. There are degenerative   endplate changes at the L3/4 and L4/5 levels. The rest of the vertebral   body marrow signal is appropriate for the patient's stated age. The conus   medullaris ends at T12/L1.     At the L1/2 level, there is minimum disc bulge there are degenerative   changes involving the facets bilaterally. There is no spinal canal   stenosis or neural foramen narrowing.    At the L2/3 level, there is minimum disc bulge with a superimposed   annular fissure with small central disc herniation. There are   degenerative changes involving the facets bilaterally as well as mild   ligamentum flavum hypertrophy. This combination results in mild central   spinal canal stenosis. There is no  neural foramen narrowing.    At the L3/4 level, there is osseous ridging with disc bulge. There are   degenerative changes involving the facets bilaterally as well as mild   ligamentum flavum hypertrophy. This combination results in mild to   moderate central spinal canal stenosis. There is mild to moderate right   neural foramen narrowing.    At the L4/5 level, the patient is status post left hemilaminectomy. There   is osseous ridging with disc bulge. There are degenerative changes   involving the facets bilaterally. There is moderate bilateral neural   foramen narrowing.    At the L5/S1 level, there is minimum disc bulge disc bulge with a   superimposed small central disc herniation. There are degenerative   changes involving the facets bilaterally. There is no spinal canal   stenosis or neural foramen narrowing.    Limited evaluation of the kidneys demonstrates multiple right-sided cysts   with the largest measuring approximately 4 cm. These are incompletely   evaluated.    IMPRESSION: Patient status post left hemilaminectomy at L4/5. Multilevel   degenerative disc disease with spinal stenosis most pronounced at the   L3/4 level as described above.    < end of copied text >              RADIOLOGY & ADDITIONAL STUDIES (The following images were personally reviewed):  Villa:                                     No  Urine output:                       adequate  DVT prophylaxis:                 Yes  Flattus:                                  Yes  Bowel movement:              No

## 2019-11-16 NOTE — PROGRESS NOTE ADULT - SUBJECTIVE AND OBJECTIVE BOX
Ortho Note    Pt comfortable, pain controlled  Denies CP, SOB, N/V, numbness/tingling   Patient very frustrated this morning re: foot drop    Vital Signs Last 24 Hrs  T(C): 36.7 (11-16-19 @ 08:20), Max: 36.9 (11-16-19 @ 05:40)  T(F): 98 (11-16-19 @ 08:20), Max: 98.4 (11-16-19 @ 05:40)  HR: 80 (11-16-19 @ 08:20) (76 - 80)  BP: 115/72 (11-16-19 @ 08:20) (115/72 - 121/70)  BP(mean): --  RR: 16 (11-16-19 @ 08:20) (16 - 18)  SpO2: 98% (11-16-19 @ 08:20) (97% - 98%)      VSS  General: A&Ox3, NAD  LLE: Prevena dressing holding suction;   Pulses: Foot WWP; DP pulse 2+; Cap refill < 2 sec  Sensation: Diminished sensation over dorsal and plantar surfaces of L foot; Otherwise SILT  Motor: TA/EHL remain 0/5; FHL/GS 5/5 and symmetric to contralateral extremity                          7.6    5.86  )-----------( 334      ( 16 Nov 2019 07:47 )             23.0     16 Nov 2019 07:46    140    |  105    |  9      ----------------------------<  118    3.5     |  25     |  0.80     Ca    8.2        16 Nov 2019 07:46

## 2019-11-17 LAB
ANION GAP SERPL CALC-SCNC: 8 MMOL/L — SIGNIFICANT CHANGE UP (ref 5–17)
BLD GP AB SCN SERPL QL: NEGATIVE — SIGNIFICANT CHANGE UP
BUN SERPL-MCNC: 9 MG/DL — SIGNIFICANT CHANGE UP (ref 7–23)
CALCIUM SERPL-MCNC: 8.1 MG/DL — LOW (ref 8.4–10.5)
CHLORIDE SERPL-SCNC: 107 MMOL/L — SIGNIFICANT CHANGE UP (ref 96–108)
CO2 SERPL-SCNC: 23 MMOL/L — SIGNIFICANT CHANGE UP (ref 22–31)
CREAT SERPL-MCNC: 0.76 MG/DL — SIGNIFICANT CHANGE UP (ref 0.5–1.3)
GLUCOSE SERPL-MCNC: 108 MG/DL — HIGH (ref 70–99)
HCT VFR BLD CALC: 24.1 % — LOW (ref 39–50)
HGB BLD-MCNC: 7.7 G/DL — LOW (ref 13–17)
MCHC RBC-ENTMCNC: 29.8 PG — SIGNIFICANT CHANGE UP (ref 27–34)
MCHC RBC-ENTMCNC: 32 GM/DL — SIGNIFICANT CHANGE UP (ref 32–36)
MCV RBC AUTO: 93.4 FL — SIGNIFICANT CHANGE UP (ref 80–100)
NRBC # BLD: 0 /100 WBCS — SIGNIFICANT CHANGE UP (ref 0–0)
PLATELET # BLD AUTO: 383 K/UL — SIGNIFICANT CHANGE UP (ref 150–400)
POTASSIUM SERPL-MCNC: 4.1 MMOL/L — SIGNIFICANT CHANGE UP (ref 3.5–5.3)
POTASSIUM SERPL-SCNC: 4.1 MMOL/L — SIGNIFICANT CHANGE UP (ref 3.5–5.3)
RBC # BLD: 2.58 M/UL — LOW (ref 4.2–5.8)
RBC # FLD: 13.6 % — SIGNIFICANT CHANGE UP (ref 10.3–14.5)
RH IG SCN BLD-IMP: POSITIVE — SIGNIFICANT CHANGE UP
SODIUM SERPL-SCNC: 138 MMOL/L — SIGNIFICANT CHANGE UP (ref 135–145)
WBC # BLD: 6.59 K/UL — SIGNIFICANT CHANGE UP (ref 3.8–10.5)
WBC # FLD AUTO: 6.59 K/UL — SIGNIFICANT CHANGE UP (ref 3.8–10.5)

## 2019-11-17 RX ADMIN — NAFCILLIN 200 GRAM(S): 10 INJECTION, POWDER, FOR SOLUTION INTRAVENOUS at 06:49

## 2019-11-17 RX ADMIN — OXYCODONE HYDROCHLORIDE 10 MILLIGRAM(S): 5 TABLET ORAL at 13:17

## 2019-11-17 RX ADMIN — NAFCILLIN 200 GRAM(S): 10 INJECTION, POWDER, FOR SOLUTION INTRAVENOUS at 14:28

## 2019-11-17 RX ADMIN — OXYCODONE HYDROCHLORIDE 10 MILLIGRAM(S): 5 TABLET ORAL at 17:37

## 2019-11-17 RX ADMIN — OXYCODONE HYDROCHLORIDE 10 MILLIGRAM(S): 5 TABLET ORAL at 03:13

## 2019-11-17 RX ADMIN — OXYCODONE HYDROCHLORIDE 10 MILLIGRAM(S): 5 TABLET ORAL at 12:17

## 2019-11-17 RX ADMIN — Medication 650 MILLIGRAM(S): at 12:30

## 2019-11-17 RX ADMIN — Medication 650 MILLIGRAM(S): at 19:39

## 2019-11-17 RX ADMIN — OXYCODONE HYDROCHLORIDE 10 MILLIGRAM(S): 5 TABLET ORAL at 16:37

## 2019-11-17 RX ADMIN — Medication 650 MILLIGRAM(S): at 06:35

## 2019-11-17 RX ADMIN — Medication 650 MILLIGRAM(S): at 18:39

## 2019-11-17 RX ADMIN — ATORVASTATIN CALCIUM 80 MILLIGRAM(S): 80 TABLET, FILM COATED ORAL at 23:14

## 2019-11-17 RX ADMIN — NAFCILLIN 200 GRAM(S): 10 INJECTION, POWDER, FOR SOLUTION INTRAVENOUS at 11:30

## 2019-11-17 RX ADMIN — OXYCODONE HYDROCHLORIDE 10 MILLIGRAM(S): 5 TABLET ORAL at 07:56

## 2019-11-17 RX ADMIN — OXYCODONE HYDROCHLORIDE 10 MILLIGRAM(S): 5 TABLET ORAL at 04:13

## 2019-11-17 RX ADMIN — NAFCILLIN 200 GRAM(S): 10 INJECTION, POWDER, FOR SOLUTION INTRAVENOUS at 18:40

## 2019-11-17 RX ADMIN — Medication 325 MILLIGRAM(S): at 23:14

## 2019-11-17 RX ADMIN — Medication 325 MILLIGRAM(S): at 05:35

## 2019-11-17 RX ADMIN — Medication 650 MILLIGRAM(S): at 05:35

## 2019-11-17 RX ADMIN — ENOXAPARIN SODIUM 110 MILLIGRAM(S): 100 INJECTION SUBCUTANEOUS at 11:30

## 2019-11-17 RX ADMIN — NAFCILLIN 200 GRAM(S): 10 INJECTION, POWDER, FOR SOLUTION INTRAVENOUS at 03:03

## 2019-11-17 RX ADMIN — Medication 325 MILLIGRAM(S): at 14:29

## 2019-11-17 RX ADMIN — CARVEDILOL PHOSPHATE 12.5 MILLIGRAM(S): 80 CAPSULE, EXTENDED RELEASE ORAL at 03:03

## 2019-11-17 RX ADMIN — Medication 650 MILLIGRAM(S): at 11:30

## 2019-11-17 RX ADMIN — NAFCILLIN 200 GRAM(S): 10 INJECTION, POWDER, FOR SOLUTION INTRAVENOUS at 23:14

## 2019-11-17 RX ADMIN — CARVEDILOL PHOSPHATE 12.5 MILLIGRAM(S): 80 CAPSULE, EXTENDED RELEASE ORAL at 14:29

## 2019-11-17 RX ADMIN — OXYCODONE HYDROCHLORIDE 10 MILLIGRAM(S): 5 TABLET ORAL at 20:35

## 2019-11-17 RX ADMIN — OXYCODONE HYDROCHLORIDE 10 MILLIGRAM(S): 5 TABLET ORAL at 21:10

## 2019-11-17 RX ADMIN — OXYCODONE HYDROCHLORIDE 10 MILLIGRAM(S): 5 TABLET ORAL at 08:56

## 2019-11-17 NOTE — PROGRESS NOTE ADULT - SUBJECTIVE AND OBJECTIVE BOX
Ortho Note    Pt had MRI done yesterday and shows fluid collection around sciatic nerve questionable for sciatic neuropathy. Otherwise, still 0/5 TA/EHL, afebrile and HD stable  Denies CP, SOB, N/V    Vital Signs Last 24 Hrs  T(C): 36.6 (11-17-19 @ 08:58), Max: 36.9 (11-17-19 @ 06:04)  T(F): 97.8 (11-17-19 @ 08:58), Max: 98.5 (11-17-19 @ 06:04)  HR: 82 (11-17-19 @ 08:58) (72 - 82)  BP: 130/72 (11-17-19 @ 08:58) (130/72 - 136/77)  BP(mean): --  RR: 18 (11-17-19 @ 08:58) (16 - 18)  SpO2: 97% (11-17-19 @ 08:58) (96% - 97%)  AVSS    General: Pt Alert and oriented, NAD  L hip prevena dressing DC, replaced with aquacell  Sensation decreased dorsal and plantar left foot  SILT RLE  LLE EHL/TA 0/5  RLE 5/5 EHL/TA/GS/FHL  toes wwp  PICC in place                           7.7    6.59  )-----------( 383      ( 17 Nov 2019 06:37 )             24.1   17 Nov 2019 06:37    138    |  107    |  9      ----------------------------<  108    4.1     |  23     |  0.76     Ca    8.1        17 Nov 2019 06:37        A/P:  64 s/p L FRANCINE explant and revision to antibiotic spacer 11/11 c/b postop L foot drop and L femoral vein DVT  - Stable  - Pain control  - AM labs reviewed  - DVT ppx: Lovenox, SCDs  - WBS: 50% PWB LLE  - PT: home w home PT  - Cont abx  - dispo home PT    Ortho Pager [phone]

## 2019-11-18 ENCOUNTER — TRANSCRIPTION ENCOUNTER (OUTPATIENT)
Age: 64
End: 2019-11-18

## 2019-11-18 PROBLEM — E78.5 HYPERLIPIDEMIA, UNSPECIFIED: Chronic | Status: ACTIVE | Noted: 2019-11-10

## 2019-11-18 PROBLEM — I10 ESSENTIAL (PRIMARY) HYPERTENSION: Chronic | Status: ACTIVE | Noted: 2019-11-10

## 2019-11-18 LAB
ANION GAP SERPL CALC-SCNC: 7 MMOL/L — SIGNIFICANT CHANGE UP (ref 5–17)
BUN SERPL-MCNC: 7 MG/DL — SIGNIFICANT CHANGE UP (ref 7–23)
CALCIUM SERPL-MCNC: 8.1 MG/DL — LOW (ref 8.4–10.5)
CHLORIDE SERPL-SCNC: 104 MMOL/L — SIGNIFICANT CHANGE UP (ref 96–108)
CO2 SERPL-SCNC: 26 MMOL/L — SIGNIFICANT CHANGE UP (ref 22–31)
CREAT SERPL-MCNC: 0.9 MG/DL — SIGNIFICANT CHANGE UP (ref 0.5–1.3)
GLUCOSE SERPL-MCNC: 127 MG/DL — HIGH (ref 70–99)
HCT VFR BLD CALC: 25 % — LOW (ref 39–50)
HGB BLD-MCNC: 8.1 G/DL — LOW (ref 13–17)
MCHC RBC-ENTMCNC: 30.3 PG — SIGNIFICANT CHANGE UP (ref 27–34)
MCHC RBC-ENTMCNC: 32.4 GM/DL — SIGNIFICANT CHANGE UP (ref 32–36)
MCV RBC AUTO: 93.6 FL — SIGNIFICANT CHANGE UP (ref 80–100)
NRBC # BLD: 0 /100 WBCS — SIGNIFICANT CHANGE UP (ref 0–0)
PLATELET # BLD AUTO: 377 K/UL — SIGNIFICANT CHANGE UP (ref 150–400)
POTASSIUM SERPL-MCNC: 4.2 MMOL/L — SIGNIFICANT CHANGE UP (ref 3.5–5.3)
POTASSIUM SERPL-SCNC: 4.2 MMOL/L — SIGNIFICANT CHANGE UP (ref 3.5–5.3)
RBC # BLD: 2.67 M/UL — LOW (ref 4.2–5.8)
RBC # FLD: 13.9 % — SIGNIFICANT CHANGE UP (ref 10.3–14.5)
SODIUM SERPL-SCNC: 137 MMOL/L — SIGNIFICANT CHANGE UP (ref 135–145)
WBC # BLD: 6.94 K/UL — SIGNIFICANT CHANGE UP (ref 3.8–10.5)
WBC # FLD AUTO: 6.94 K/UL — SIGNIFICANT CHANGE UP (ref 3.8–10.5)

## 2019-11-18 PROCEDURE — 99222 1ST HOSP IP/OBS MODERATE 55: CPT

## 2019-11-18 PROCEDURE — 99232 SBSQ HOSP IP/OBS MODERATE 35: CPT | Mod: GC

## 2019-11-18 RX ORDER — CYCLOBENZAPRINE HYDROCHLORIDE 10 MG/1
5 TABLET, FILM COATED ORAL THREE TIMES A DAY
Refills: 0 | Status: DISCONTINUED | OUTPATIENT
Start: 2019-11-18 | End: 2019-11-19

## 2019-11-18 RX ORDER — GABAPENTIN 400 MG/1
300 CAPSULE ORAL THREE TIMES A DAY
Refills: 0 | Status: DISCONTINUED | OUTPATIENT
Start: 2019-11-18 | End: 2019-11-19

## 2019-11-18 RX ORDER — APIXABAN 2.5 MG/1
10 TABLET, FILM COATED ORAL EVERY 12 HOURS
Refills: 0 | Status: DISCONTINUED | OUTPATIENT
Start: 2019-11-18 | End: 2019-11-19

## 2019-11-18 RX ADMIN — OXYCODONE HYDROCHLORIDE 10 MILLIGRAM(S): 5 TABLET ORAL at 00:35

## 2019-11-18 RX ADMIN — Medication 650 MILLIGRAM(S): at 12:05

## 2019-11-18 RX ADMIN — OXYCODONE HYDROCHLORIDE 10 MILLIGRAM(S): 5 TABLET ORAL at 23:34

## 2019-11-18 RX ADMIN — NAFCILLIN 200 GRAM(S): 10 INJECTION, POWDER, FOR SOLUTION INTRAVENOUS at 02:30

## 2019-11-18 RX ADMIN — ATORVASTATIN CALCIUM 80 MILLIGRAM(S): 80 TABLET, FILM COATED ORAL at 21:49

## 2019-11-18 RX ADMIN — OXYCODONE HYDROCHLORIDE 10 MILLIGRAM(S): 5 TABLET ORAL at 06:45

## 2019-11-18 RX ADMIN — Medication 325 MILLIGRAM(S): at 21:49

## 2019-11-18 RX ADMIN — CARVEDILOL PHOSPHATE 12.5 MILLIGRAM(S): 80 CAPSULE, EXTENDED RELEASE ORAL at 06:04

## 2019-11-18 RX ADMIN — OXYCODONE HYDROCHLORIDE 5 MILLIGRAM(S): 5 TABLET ORAL at 14:49

## 2019-11-18 RX ADMIN — APIXABAN 10 MILLIGRAM(S): 2.5 TABLET, FILM COATED ORAL at 23:19

## 2019-11-18 RX ADMIN — Medication 650 MILLIGRAM(S): at 13:05

## 2019-11-18 RX ADMIN — OXYCODONE HYDROCHLORIDE 10 MILLIGRAM(S): 5 TABLET ORAL at 06:04

## 2019-11-18 RX ADMIN — CARVEDILOL PHOSPHATE 12.5 MILLIGRAM(S): 80 CAPSULE, EXTENDED RELEASE ORAL at 14:48

## 2019-11-18 RX ADMIN — NAFCILLIN 200 GRAM(S): 10 INJECTION, POWDER, FOR SOLUTION INTRAVENOUS at 09:59

## 2019-11-18 RX ADMIN — Medication 650 MILLIGRAM(S): at 17:37

## 2019-11-18 RX ADMIN — Medication 325 MILLIGRAM(S): at 14:48

## 2019-11-18 RX ADMIN — Medication 650 MILLIGRAM(S): at 18:37

## 2019-11-18 RX ADMIN — NAFCILLIN 200 GRAM(S): 10 INJECTION, POWDER, FOR SOLUTION INTRAVENOUS at 06:04

## 2019-11-18 RX ADMIN — ENOXAPARIN SODIUM 110 MILLIGRAM(S): 100 INJECTION SUBCUTANEOUS at 00:36

## 2019-11-18 RX ADMIN — ENOXAPARIN SODIUM 110 MILLIGRAM(S): 100 INJECTION SUBCUTANEOUS at 12:05

## 2019-11-18 RX ADMIN — CYCLOBENZAPRINE HYDROCHLORIDE 5 MILLIGRAM(S): 10 TABLET, FILM COATED ORAL at 12:10

## 2019-11-18 RX ADMIN — Medication 325 MILLIGRAM(S): at 06:03

## 2019-11-18 RX ADMIN — POLYETHYLENE GLYCOL 3350 17 GRAM(S): 17 POWDER, FOR SOLUTION ORAL at 12:05

## 2019-11-18 RX ADMIN — OXYCODONE HYDROCHLORIDE 5 MILLIGRAM(S): 5 TABLET ORAL at 15:49

## 2019-11-18 RX ADMIN — OXYCODONE HYDROCHLORIDE 10 MILLIGRAM(S): 5 TABLET ORAL at 01:10

## 2019-11-18 RX ADMIN — NAFCILLIN 200 GRAM(S): 10 INJECTION, POWDER, FOR SOLUTION INTRAVENOUS at 23:19

## 2019-11-18 RX ADMIN — NAFCILLIN 200 GRAM(S): 10 INJECTION, POWDER, FOR SOLUTION INTRAVENOUS at 17:37

## 2019-11-18 RX ADMIN — NAFCILLIN 200 GRAM(S): 10 INJECTION, POWDER, FOR SOLUTION INTRAVENOUS at 14:48

## 2019-11-18 RX ADMIN — CYCLOBENZAPRINE HYDROCHLORIDE 5 MILLIGRAM(S): 10 TABLET, FILM COATED ORAL at 20:21

## 2019-11-18 NOTE — CONSULT NOTE ADULT - REASON FOR ADMISSION
L total hip infection

## 2019-11-18 NOTE — PROGRESS NOTE ADULT - SUBJECTIVE AND OBJECTIVE BOX
Ortho Note    Pt seen & examined.  reports spasm pain in calf & foot.  Denies CP, SOB, N/V    Vital Signs Last 24 Hrs  T(C): 36.6 (11-17-19 @ 08:58), Max: 36.9 (11-17-19 @ 06:04)  T(F): 97.8 (11-17-19 @ 08:58), Max: 98.5 (11-17-19 @ 06:04)  HR: 82 (11-17-19 @ 08:58) (72 - 82)  BP: 130/72 (11-17-19 @ 08:58) (130/72 - 136/77)  BP(mean): --  RR: 18 (11-17-19 @ 08:58) (16 - 18)  SpO2: 97% (11-17-19 @ 08:58) (96% - 97%)  AVSS    General: Pt Alert and oriented, NAD  aquacell c/d/i  Sensation decreased dorsal and plantar left foot  SILT RLE  LLE EHL/TA 0/5  RLE intact EHL/TA/GS/FHL  toes wwp  PICC in place                                    7.7    6.59  )-----------( 383      ( 17 Nov 2019 06:37 )             24.1       A/P:  64 s/p L FRANCINE explant and revision to antibiotic spacer 11/11 c/b postop L foot drop and L femoral vein DVT  - Stable  - Pain control  -  consider flexeril vs repeat doppler  - AM labs reviewed  - DVT ppx: Lovenox, SCDs  - WBS: 50% PWB LLE  - PT: home w home PT  - Cont abx  - dispo home PT    Ortho Pager [phone]

## 2019-11-18 NOTE — PROGRESS NOTE ADULT - PROBLEM SELECTOR PLAN 7
continue anticoagulation. If Hb stable change to apixaban 10 mg bid for one week then 4 mg bid 3 month or until the after the next surgery.  Changed to Apixaban

## 2019-11-18 NOTE — CONSULT NOTE ADULT - SUBJECTIVE AND OBJECTIVE BOX
Patient is a 64y old  Male who presents with a chief complaint of L total hip infection (18 Nov 2019 09:45)        HPI:  64 M history of multiple stents, HTN, HLD w b/l Hip replacements complicated by L hip infection treated w debridement and prolonged period of abx however infection persists after completing antibiotic regimen. Pt states the initial surgery was in April of this year, the infection began shortly after and was  treated w a debridement by the original surgeon at an OSH. after the debridement he was placed on a prolonged course of IV abx and after this finished in october the infection came back as proven by hip aspiration several days ago. After seeing Dr. Mead and sending him updated pictures today 11/10 he was sent in for medical management and for explant and placement of a spacer. Pt does endorse fevers and chills. States he has had purulent drainage from the incision. no numbness, tingling or weakness reported. 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  Hip pain  Acute deep vein thrombosis (DVT) of femoral vein of left lower extremity  Anemia due to blood loss  Foot drop  Postoperative state  Preoperative clearance  Coronary artery disease involving native coronary artery of native heart without angina pectoris  Pyogenic arthritis of left hip, due to unspecified organism  Pure hypercholesterolemia  Essential hypertension  Insertion, antibiotic spacer, joint, hip  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 650 milliGRAM(s) Oral every 6 hours  atorvastatin 80 milliGRAM(s) Oral at bedtime  BUpivacaine liposome 1.3% Injectable (no eMAR) 20 milliLiter(s) Local Injection once  carvedilol 12.5 milliGRAM(s) Oral every 12 hours  chlorhexidine 2% Cloths 1 Application(s) Topical <User Schedule>  enoxaparin Injectable 110 milliGRAM(s) SubCutaneous every 12 hours  ferrous    sulfate 325 milliGRAM(s) Oral three times a day  lactated ringers. 1000 milliLiter(s) (80 mL/Hr) IV Continuous <Continuous>  nafcillin  IVPB 2 Gram(s) IV Intermittent every 4 hours  polyethylene glycol 3350 17 Gram(s) Oral daily  povidone iodine 10% Swab 1 Application(s) Topical once  rifAMPin 300 milliGRAM(s) Oral every 12 hours  senna 2 Tablet(s) Oral at bedtime    MEDICATIONS  (PRN):  bisacodyl Suppository 10 milliGRAM(s) Rectal daily PRN Constipation  cyclobenzaprine 5 milliGRAM(s) Oral three times a day PRN Muscle Spasm  gabapentin 300 milliGRAM(s) Oral three times a day PRN lower leg pain  HYDROmorphone  Injectable 0.5 milliGRAM(s) IV Push every 4 hours PRN severe breakthrough pain only  HYDROmorphone  Injectable 0.5 milliGRAM(s) IV Push every 15 minutes PRN pacu pain  magnesium hydroxide Suspension 30 milliLiter(s) Oral daily PRN Constipation  metoclopramide Injectable 10 milliGRAM(s) IV Push every 6 hours PRN Nausea and/or Vomiting  oxyCODONE    IR 10 milliGRAM(s) Oral every 4 hours PRN Severe Pain (7 - 10)  oxyCODONE    IR 5 milliGRAM(s) Oral every 4 hours PRN Moderate Pain (4 - 6)  sodium chloride 0.9% lock flush 10 milliLiter(s) IV Push every 1 hour PRN Pre/post blood products, medications, blood draw, and to maintain line patency      PAST MEDICAL & SURGICAL HISTORY:  Hypertension  Hyperlipidemia      Labs                          8.1    6.94  )-----------( 377      ( 18 Nov 2019 06:58 )             25.0     11-18    137  |  104  |  7   ----------------------------<  127<H>  4.2   |  26  |  0.90    Ca    8.1<L>      18 Nov 2019 06:58        Radiology:    Physical Exam    MENTAL STATUS  -Level of Consciousness- awake    Orientation- person, place time  Language- aphasia/ dysarthria- nl  Memory- recent and remote- nl      Cranial Nerve 1- 12  Pupils- equal and reactive  Eye movements- full  Facial - no asymmetry   Lower CN-nl    Gait and Station- N/a    MOTOR  Upper- nl  Lower- Left peroneal weakness 1-2/5    Reflexes- absent left AJ    Sensation left sciatic sensory loss    Cerebellar- no tremors    vascular - no bruits    Assessment- Sciatic neuropathy - primarily peroneal     Plan as per ortho

## 2019-11-18 NOTE — DISCHARGE NOTE NURSING/CASE MANAGEMENT/SOCIAL WORK - PATIENT PORTAL LINK FT
You can access the FollowMyHealth Patient Portal offered by [facility] by registering at the following website: [email or web address]. By joining [facility]’s FollowMyHealth portal, you will also be able to view your health information using other applications (apps) compatible with our system.

## 2019-11-18 NOTE — PROGRESS NOTE ADULT - SUBJECTIVE AND OBJECTIVE BOX
Interval Events: Reviewed  Patient seen and examined at bedside.    Patient is a 64y old  Male who presents with a chief complaint of L total hip infection (18 Nov 2019 12:42)  he is doing better and pain is controlled     PAST MEDICAL & SURGICAL HISTORY:  Hypertension  Hyperlipidemia      MEDICATIONS:  Pulmonary:    Antimicrobials:  nafcillin  IVPB 2 Gram(s) IV Intermittent every 4 hours  rifAMPin 300 milliGRAM(s) Oral every 12 hours    Anticoagulants:  apixaban 10 milliGRAM(s) Oral every 12 hours    Cardiac:  carvedilol 12.5 milliGRAM(s) Oral every 12 hours      Allergies    No Known Allergies    Intolerances        Vital Signs Last 24 Hrs  T(C): 36.7 (18 Nov 2019 08:10), Max: 36.9 (17 Nov 2019 16:26)  T(F): 98.1 (18 Nov 2019 08:10), Max: 98.5 (17 Nov 2019 16:26)  HR: 77 (18 Nov 2019 08:10) (77 - 82)  BP: 135/78 (18 Nov 2019 08:10) (125/64 - 135/78)  BP(mean): --  RR: 17 (18 Nov 2019 08:10) (16 - 17)  SpO2: 98% (18 Nov 2019 08:10) (97% - 98%)    11-17 @ 07:01  -  11-18 @ 07:00  --------------------------------------------------------  IN: 1240 mL / OUT: 600 mL / NET: 640 mL    11-18 @ 07:01 - 11-18 @ 15:35  --------------------------------------------------------  IN: 540 mL / OUT: 400 mL / NET: 140 mL          Review of Systems:   •	General: negative  •	Skin/Breast: negative  •	Ophthalmologic: negative  •	ENMT: negative  •	Respiratory and Thorax: negative  •	Cardiovascular: negative  •	Gastrointestinal: negative  •	Genitourinary: negative  •	Musculoskeletal: negative  •	Neurological: negative  •	Psychiatric: negative  •	Hematology/Lymphatics: negative  •	Endocrine: negative  •	Allergic/Immunologic: negative    Physical Exam:   • Constitutional:	Well-developed, well nourished  • Eyes:	EOMI; PERRL; no drainage or redness  • ENMT:	No oral lesions; no gross abnormalities  • Neck	No bruits; no thyromegaly or nodules  • Breasts:	not examined  • Back:	No deformity or limitation of movement  • Respiratory:	Breath Sounds equal & clear to percussion & auscultation, no accessory muscle use  • Cardiovascular:	Regular rate & rhythm, normal S1, S2; no murmurs, gallops or rubs; no S3, S4  • Gastrointestinal:	Soft, non-tender, no hepatosplenomegaly, normal bowel sounds  • Genitourinary:	not examined  • Rectal: not examined  • Extremities:	No cyanosis, clubbing or edema  • Vascular:	Equal and normal pulses (carotid, femoral, dorsalis pedis)  • Neurologica:l	not examined  • Skin:	No lesions; no rash  • Lymph Nodes:	No lymphadedenopathy  • Musculoskeletal:	No joint pain, swelling or deformity; no limitation of movement        LABS:      CBC Full  -  ( 18 Nov 2019 06:58 )  WBC Count : 6.94 K/uL  RBC Count : 2.67 M/uL  Hemoglobin : 8.1 g/dL  Hematocrit : 25.0 %  Platelet Count - Automated : 377 K/uL  Mean Cell Volume : 93.6 fl  Mean Cell Hemoglobin : 30.3 pg  Mean Cell Hemoglobin Concentration : 32.4 gm/dL  Auto Neutrophil # : x  Auto Lymphocyte # : x  Auto Monocyte # : x  Auto Eosinophil # : x  Auto Basophil # : x  Auto Neutrophil % : x  Auto Lymphocyte % : x  Auto Monocyte % : x  Auto Eosinophil % : x  Auto Basophil % : x    11-18    137  |  104  |  7   ----------------------------<  127<H>  4.2   |  26  |  0.90    Ca    8.1<L>      18 Nov 2019 06:58                          RADIOLOGY & ADDITIONAL STUDIES (The following images were personally reviewed):  Villa:                                     No  Urine output:                       adequate  DVT prophylaxis:                 Yes  Flattus:                                  Yes  Bowel movement:              yes

## 2019-11-18 NOTE — DISCHARGE NOTE NURSING/CASE MANAGEMENT/SOCIAL WORK - NSSCNAMETXT_GEN_ALL_CORE
[facility] [place] [facility] [employer] ([facility])

## 2019-11-18 NOTE — PROGRESS NOTE ADULT - SUBJECTIVE AND OBJECTIVE BOX
Orthopaedic Surgery Progress Note    Patient seen and examined. RISA. Pain controlled. Endorsing left calf spasm - flexeril added to regimen this morning. Denies CP, SOB, N/V, tactile fevers. Pt likely to clear PT today. Pt discussed results of MRI of left sciatic nerve with Dr. Mead yesterday.     Vital Signs Last 24 Hrs  T(C): 36.7 (18 Nov 2019 08:10), Max: 36.9 (17 Nov 2019 16:26)  T(F): 98.1 (18 Nov 2019 08:10), Max: 98.5 (17 Nov 2019 16:26)  HR: 77 (18 Nov 2019 08:10) (77 - 82)  BP: 135/78 (18 Nov 2019 08:10) (120/65 - 135/78)  BP(mean): --  RR: 17 (18 Nov 2019 08:10) (16 - 17)  SpO2: 98% (18 Nov 2019 08:10) (97% - 98%)      Physical Exam:  Pt laying comfortably in bed, NAD. Left hip and knee flexed. Left lower extremity AFO in place.   Skin warm and well perfused, no visible erythema/ecchymoses.  Dressing C/D/I  EHL/TA 0/5 left lower extremity  Firing EHL/TA right lower extremity and GS/FHL bilaterally   Left calf swollen with mild tenderness to light palpation (diagnosed DVT on 11/12 - on Lovenox)  Distal lower extremities warm and well perfused; DP pulses 2+    LABS                        8.1    6.94  )-----------( 377      ( 18 Nov 2019 06:58 )             25.0                                11-18    137  |  104  |  7   ----------------------------<  127<H>  4.2   |  26  |  0.90    Ca    8.1<L>      18 Nov 2019 06:58      A/P: 64M POD #7 s/p left total hip explant with implantation of antibiotic spacer, cultures growing staph    CONTINUE:        1. PT: WBAT with AFO  2. DVT prophylaxis: Lovenox - to be changed to Eliquis on discharge   3. Pain Control as needed   4. Dispo: Home pending PT clearance; home infusion services has been set up

## 2019-11-18 NOTE — DISCHARGE NOTE NURSING/CASE MANAGEMENT/SOCIAL WORK - NSDCPEELIQUIS_GEN_ALL_CORE
Apixaban/Eliquis - Dietary Advice/Apixaban/Eliquis - Follow up monitoring/Apixaban/Eliquis - Potential for adverse drug reactions and interactions/Apixaban/Eliquis - Compliance

## 2019-11-19 ENCOUNTER — INBOUND DOCUMENT (OUTPATIENT)
Age: 64
End: 2019-11-19

## 2019-11-19 VITALS
TEMPERATURE: 99 F | SYSTOLIC BLOOD PRESSURE: 131 MMHG | DIASTOLIC BLOOD PRESSURE: 76 MMHG | RESPIRATION RATE: 17 BRPM | OXYGEN SATURATION: 97 % | HEART RATE: 81 BPM

## 2019-11-19 PROCEDURE — 99232 SBSQ HOSP IP/OBS MODERATE 35: CPT

## 2019-11-19 PROCEDURE — 99232 SBSQ HOSP IP/OBS MODERATE 35: CPT | Mod: GC

## 2019-11-19 RX ORDER — APIXABAN 2.5 MG/1
1 TABLET, FILM COATED ORAL
Qty: 60 | Refills: 2
Start: 2019-11-19 | End: 2020-02-16

## 2019-11-19 RX ORDER — GABAPENTIN 400 MG/1
1 CAPSULE ORAL
Qty: 42 | Refills: 0
Start: 2019-11-19 | End: 2019-12-02

## 2019-11-19 RX ORDER — FERROUS SULFATE 325(65) MG
1 TABLET ORAL
Qty: 90 | Refills: 0
Start: 2019-11-19 | End: 2019-12-18

## 2019-11-19 RX ADMIN — OXYCODONE HYDROCHLORIDE 10 MILLIGRAM(S): 5 TABLET ORAL at 09:42

## 2019-11-19 RX ADMIN — NAFCILLIN 200 GRAM(S): 10 INJECTION, POWDER, FOR SOLUTION INTRAVENOUS at 02:22

## 2019-11-19 RX ADMIN — Medication 325 MILLIGRAM(S): at 05:27

## 2019-11-19 RX ADMIN — CARVEDILOL PHOSPHATE 12.5 MILLIGRAM(S): 80 CAPSULE, EXTENDED RELEASE ORAL at 05:27

## 2019-11-19 RX ADMIN — OXYCODONE HYDROCHLORIDE 10 MILLIGRAM(S): 5 TABLET ORAL at 05:27

## 2019-11-19 RX ADMIN — OXYCODONE HYDROCHLORIDE 10 MILLIGRAM(S): 5 TABLET ORAL at 06:10

## 2019-11-19 RX ADMIN — APIXABAN 10 MILLIGRAM(S): 2.5 TABLET, FILM COATED ORAL at 09:42

## 2019-11-19 RX ADMIN — NAFCILLIN 200 GRAM(S): 10 INJECTION, POWDER, FOR SOLUTION INTRAVENOUS at 06:23

## 2019-11-19 RX ADMIN — OXYCODONE HYDROCHLORIDE 10 MILLIGRAM(S): 5 TABLET ORAL at 00:10

## 2019-11-19 RX ADMIN — NAFCILLIN 200 GRAM(S): 10 INJECTION, POWDER, FOR SOLUTION INTRAVENOUS at 09:42

## 2019-11-19 NOTE — PROGRESS NOTE ADULT - SUBJECTIVE AND OBJECTIVE BOX
Neurology Follow up note    Name  MERYL PIZARRO    HPI:  64 M history of multiple stents, HTN, HLD w b/l Hip replacements complicated by L hip infection treated w debridement and prolonged period of abx however infection persists after completing antibiotic regimen. Pt states the initial surgery was in April of this year, the infection began shortly after and was  treated w a debridement by the original surgeon at an OSH. after the debridement he was placed on a prolonged course of IV abx and after this finished in october the infection came back as proven by hip aspiration several days ago. After seeing Dr. Mead and sending him updated pictures today 11/10 he was sent in for medical management and for explant and placement of a spacer. Pt does endorse fevers and chills. States he has had purulent drainage from the incision. no numbness, tingling or weakness reported. Otherwise healthy (10 Nov 2019 20:51)      Interval History - continued weakness in the LLE - no back pain        REVIEW OF SYSTEMS    Vital Signs Last 24 Hrs  T(C): 37.1 (19 Nov 2019 08:10), Max: 37.1 (19 Nov 2019 05:40)  T(F): 98.7 (19 Nov 2019 08:10), Max: 98.7 (19 Nov 2019 05:40)  HR: 81 (19 Nov 2019 08:10) (76 - 81)  BP: 131/76 (19 Nov 2019 08:10) (126/73 - 131/76)  BP(mean): --  RR: 17 (19 Nov 2019 08:10) (17 - 18)  SpO2: 97% (19 Nov 2019 08:10) (96% - 98%)    Physical Exam-     Mental Status- awake and alert    Cranial Nerves- full EOM    Gait and station- n/a    Motor- LLE peroneal 2/5    Reflexes- absent Left AJ    Sensation-    Coordination- absent left AJ    Vascular - pulses intact    Medications  acetaminophen   Tablet .. 650 milliGRAM(s) Oral every 6 hours  apixaban 10 milliGRAM(s) Oral every 12 hours  atorvastatin 80 milliGRAM(s) Oral at bedtime  bisacodyl Suppository 10 milliGRAM(s) Rectal daily PRN  BUpivacaine liposome 1.3% Injectable (no eMAR) 20 milliLiter(s) Local Injection once  carvedilol 12.5 milliGRAM(s) Oral every 12 hours  chlorhexidine 2% Cloths 1 Application(s) Topical <User Schedule>  cyclobenzaprine 5 milliGRAM(s) Oral three times a day PRN  ferrous    sulfate 325 milliGRAM(s) Oral three times a day  gabapentin 300 milliGRAM(s) Oral three times a day PRN  HYDROmorphone  Injectable 0.5 milliGRAM(s) IV Push every 4 hours PRN  lactated ringers. 1000 milliLiter(s) IV Continuous <Continuous>  magnesium hydroxide Suspension 30 milliLiter(s) Oral daily PRN  metoclopramide Injectable 10 milliGRAM(s) IV Push every 6 hours PRN  nafcillin  IVPB 2 Gram(s) IV Intermittent every 4 hours  oxyCODONE    IR 10 milliGRAM(s) Oral every 4 hours PRN  oxyCODONE    IR 5 milliGRAM(s) Oral every 4 hours PRN  polyethylene glycol 3350 17 Gram(s) Oral daily  povidone iodine 10% Swab 1 Application(s) Topical once  rifAMPin 300 milliGRAM(s) Oral every 12 hours  senna 2 Tablet(s) Oral at bedtime  sodium chloride 0.9% lock flush 10 milliLiter(s) IV Push every 1 hour PRN      Lab      Radiology    Assessment- Left peroneal sciatic neuropathy    Plan as per ortho

## 2019-11-19 NOTE — PROGRESS NOTE ADULT - REASON FOR ADMISSION
L total hip infection

## 2019-11-19 NOTE — PROGRESS NOTE ADULT - SUBJECTIVE AND OBJECTIVE BOX
Interval Events: Reviewed  Patient seen and examined at bedside.    Patient is a 64y old  Male who presents with a chief complaint of L total hip infection (19 Nov 2019 12:47)  ric Jeter    PAST MEDICAL & SURGICAL HISTORY:  Hypertension  Hyperlipidemia      MEDICATIONS:  Pulmonary:    Antimicrobials:  nafcillin  IVPB 2 Gram(s) IV Intermittent every 4 hours  rifAMPin 300 milliGRAM(s) Oral every 12 hours    Anticoagulants:  apixaban 10 milliGRAM(s) Oral every 12 hours    Cardiac:  carvedilol 12.5 milliGRAM(s) Oral every 12 hours      Allergies    No Known Allergies    Intolerances        Vital Signs Last 24 Hrs  T(C): 37.1 (19 Nov 2019 08:10), Max: 37.1 (19 Nov 2019 05:40)  T(F): 98.7 (19 Nov 2019 08:10), Max: 98.7 (19 Nov 2019 05:40)  HR: 81 (19 Nov 2019 08:10) (80 - 81)  BP: 131/76 (19 Nov 2019 08:10) (126/74 - 131/76)  BP(mean): --  RR: 17 (19 Nov 2019 08:10) (17 - 18)  SpO2: 97% (19 Nov 2019 08:10) (97% - 98%)    11-18 @ 07:01 - 11-19 @ 07:00  --------------------------------------------------------  IN: 540 mL / OUT: 1900 mL / NET: -1360 mL    11-19 @ 07:01 - 11-19 @ 20:27  --------------------------------------------------------  IN: 240 mL / OUT: 400 mL / NET: -160 mL          Review of Systems:   •	General: negative  •	Skin/Breast: negative  •	Ophthalmologic: negative  •	ENMT: negative  •	Respiratory and Thorax: negative  •	Cardiovascular: negative  •	Gastrointestinal: negative  •	Genitourinary: negative  •	Musculoskeletal: negative  •	Neurological: negative  •	Psychiatric: negative  •	Hematology/Lymphatics: negative  •	Endocrine: negative  •	Allergic/Immunologic: negative    Physical Exam:   • Constitutional:	Well-developed, well nourished  • Eyes:	EOMI; PERRL; no drainage or redness  • ENMT:	No oral lesions; no gross abnormalities  • Neck	No bruits; no thyromegaly or nodules  • Breasts:	not examined  • Back:	No deformity or limitation of movement  • Respiratory:	Breath Sounds equal & clear to percussion & auscultation, no accessory muscle use  • Cardiovascular:	Regular rate & rhythm, normal S1, S2; no murmurs, gallops or rubs; no S3, S4  • Gastrointestinal:	Soft, non-tender, no hepatosplenomegaly, normal bowel sounds  • Genitourinary:	not examined  • Rectal: not examined  • Extremities:	No cyanosis, clubbing or edema  • Vascular:	Equal and normal pulses (carotid, femoral, dorsalis pedis)  • Neurologica:l	not examined  • Skin:	No lesions; no rash  • Lymph Nodes:	No lymphadedenopathy  • Musculoskeletal:	No joint pain, swelling or deformity; no limitation of movement        LABS:      CBC Full  -  ( 18 Nov 2019 06:58 )  WBC Count : 6.94 K/uL  RBC Count : 2.67 M/uL  Hemoglobin : 8.1 g/dL  Hematocrit : 25.0 %  Platelet Count - Automated : 377 K/uL  Mean Cell Volume : 93.6 fl  Mean Cell Hemoglobin : 30.3 pg  Mean Cell Hemoglobin Concentration : 32.4 gm/dL  Auto Neutrophil # : x  Auto Lymphocyte # : x  Auto Monocyte # : x  Auto Eosinophil # : x  Auto Basophil # : x  Auto Neutrophil % : x  Auto Lymphocyte % : x  Auto Monocyte % : x  Auto Eosinophil % : x  Auto Basophil % : x    11-18    137  |  104  |  7   ----------------------------<  127<H>  4.2   |  26  |  0.90    Ca    8.1<L>      18 Nov 2019 06:58                          RADIOLOGY & ADDITIONAL STUDIES (The following images were personally reviewed):  Villa:                                     No  Urine output:                       adequate  DVT prophylaxis:                 Yes  Flattus:                                  Yes  Bowel movement:              No

## 2019-11-19 NOTE — PROGRESS NOTE ADULT - PROBLEM SELECTOR PLAN 3
still growing staph aureus and on Nafcillin and rifampin.  Blood cultures negatIve.  PICC site stable

## 2019-11-19 NOTE — PROGRESS NOTE ADULT - ATTENDING COMMENTS
I have reviewed the medical record, including laboratory and radiographic studies, interviewed and examined the patient and discussed the plan with Dr. Guthrie, the ID Resident.  Agree with above. Please recall if further ID input is desired – ID Team 1.
Patient seen and examined with house-staff during bedside rounds.  Resident note read, including vitals, physical findings, laboratory data, and radiological reports.   Revisions included below.  Direct personal management at bed side and extensive interpretation of the data.  Plan was outlined and discussed in details with the housestaff.  Decision making of high complexity  Action taken for acute disease activity to reflect the level of care provided:  - medication reconciliation  - review laboratory data
Patient seen and examined with house-staff during bedside rounds.  Resident note read, including vitals, physical findings, laboratory data, and radiological reports.   Revisions included below.  Direct personal management at bed side and extensive interpretation of the data.  Plan was outlined and discussed in details with the housestaff.  Decision making of high complexity  Action taken for acute disease activity to reflect the level of care provided:  - medication reconciliation  - review laboratory data  neuro consult
Patient seen and examined with house-staff during bedside rounds.  Resident note read, including vitals, physical findings, laboratory data, and radiological reports.   Revisions included below.  Direct personal management at bed side and extensive interpretation of the data.  Plan was outlined and discussed in details with the housestaff.  Decision making of high complexity  Action taken for acute disease activity to reflect the level of care provided:  - medication reconciliation  - review laboratory data  neuro consult
Patient seen and examined with house-staff during bedside rounds.  Resident note read, including vitals, physical findings, laboratory data, and radiological reports.   Revisions included below.  Direct personal management at bed side and extensive interpretation of the data.  Plan was outlined and discussed in details with the housestaff.  Decision making of high complexity  Action taken for acute disease activity to reflect the level of care provided:  - medication reconciliation  - review laboratory data  neuro consult SAW THE PATIENT  He is stable for DC and will follow as outpatient needs venous doppler prior next surgery

## 2019-11-21 PROCEDURE — 87070 CULTURE OTHR SPECIMN AEROBIC: CPT

## 2019-11-21 PROCEDURE — 87102 FUNGUS ISOLATION CULTURE: CPT

## 2019-11-21 PROCEDURE — 97116 GAIT TRAINING THERAPY: CPT

## 2019-11-21 PROCEDURE — 36430 TRANSFUSION BLD/BLD COMPNT: CPT

## 2019-11-21 PROCEDURE — 87186 SC STD MICRODIL/AGAR DIL: CPT

## 2019-11-21 PROCEDURE — 87075 CULTR BACTERIA EXCEPT BLOOD: CPT

## 2019-11-21 PROCEDURE — 85730 THROMBOPLASTIN TIME PARTIAL: CPT

## 2019-11-21 PROCEDURE — C1769: CPT

## 2019-11-21 PROCEDURE — 86850 RBC ANTIBODY SCREEN: CPT

## 2019-11-21 PROCEDURE — C1776: CPT

## 2019-11-21 PROCEDURE — 93971 EXTREMITY STUDY: CPT

## 2019-11-21 PROCEDURE — 88304 TISSUE EXAM BY PATHOLOGIST: CPT

## 2019-11-21 PROCEDURE — 72148 MRI LUMBAR SPINE W/O DYE: CPT

## 2019-11-21 PROCEDURE — 86923 COMPATIBILITY TEST ELECTRIC: CPT

## 2019-11-21 PROCEDURE — 93005 ELECTROCARDIOGRAM TRACING: CPT

## 2019-11-21 PROCEDURE — 86900 BLOOD TYPING SEROLOGIC ABO: CPT

## 2019-11-21 PROCEDURE — 87040 BLOOD CULTURE FOR BACTERIA: CPT

## 2019-11-21 PROCEDURE — 86140 C-REACTIVE PROTEIN: CPT

## 2019-11-21 PROCEDURE — 71046 X-RAY EXAM CHEST 2 VIEWS: CPT

## 2019-11-21 PROCEDURE — 72170 X-RAY EXAM OF PELVIS: CPT

## 2019-11-21 PROCEDURE — 99285 EMERGENCY DEPT VISIT HI MDM: CPT | Mod: 25

## 2019-11-21 PROCEDURE — C1713: CPT

## 2019-11-21 PROCEDURE — P9016: CPT

## 2019-11-21 PROCEDURE — 83605 ASSAY OF LACTIC ACID: CPT

## 2019-11-21 PROCEDURE — 36415 COLL VENOUS BLD VENIPUNCTURE: CPT

## 2019-11-21 PROCEDURE — 73502 X-RAY EXAM HIP UNI 2-3 VIEWS: CPT

## 2019-11-21 PROCEDURE — 81003 URINALYSIS AUTO W/O SCOPE: CPT

## 2019-11-21 PROCEDURE — 86901 BLOOD TYPING SEROLOGIC RH(D): CPT

## 2019-11-21 PROCEDURE — 73721 MRI JNT OF LWR EXTRE W/O DYE: CPT

## 2019-11-21 PROCEDURE — 80053 COMPREHEN METABOLIC PANEL: CPT

## 2019-11-21 PROCEDURE — 85027 COMPLETE CBC AUTOMATED: CPT

## 2019-11-21 PROCEDURE — 85610 PROTHROMBIN TIME: CPT

## 2019-11-21 PROCEDURE — 87641 MR-STAPH DNA AMP PROBE: CPT

## 2019-11-21 PROCEDURE — 87086 URINE CULTURE/COLONY COUNT: CPT

## 2019-11-21 PROCEDURE — 97530 THERAPEUTIC ACTIVITIES: CPT

## 2019-11-21 PROCEDURE — 85652 RBC SED RATE AUTOMATED: CPT

## 2019-11-21 PROCEDURE — 97161 PT EVAL LOW COMPLEX 20 MIN: CPT

## 2019-11-21 PROCEDURE — 97110 THERAPEUTIC EXERCISES: CPT

## 2019-11-21 PROCEDURE — 36573 INSJ PICC RS&I 5 YR+: CPT

## 2019-11-21 PROCEDURE — 80048 BASIC METABOLIC PNL TOTAL CA: CPT

## 2019-11-21 PROCEDURE — 85025 COMPLETE CBC W/AUTO DIFF WBC: CPT

## 2019-11-25 RX ORDER — CYCLOBENZAPRINE HYDROCHLORIDE 5 MG/1
5 TABLET, FILM COATED ORAL 3 TIMES DAILY
Qty: 15 | Refills: 0 | Status: ACTIVE | COMMUNITY
Start: 2019-11-25 | End: 1900-01-01

## 2019-11-26 ENCOUNTER — APPOINTMENT (OUTPATIENT)
Dept: ORTHOPEDIC SURGERY | Facility: CLINIC | Age: 64
End: 2019-11-26

## 2019-12-02 ENCOUNTER — FORM ENCOUNTER (OUTPATIENT)
Age: 64
End: 2019-12-02

## 2019-12-03 ENCOUNTER — OUTPATIENT (OUTPATIENT)
Dept: OUTPATIENT SERVICES | Facility: HOSPITAL | Age: 64
LOS: 1 days | End: 2019-12-03
Payer: MEDICARE

## 2019-12-03 ENCOUNTER — APPOINTMENT (OUTPATIENT)
Dept: ORTHOPEDIC SURGERY | Facility: CLINIC | Age: 64
End: 2019-12-03
Payer: MEDICARE

## 2019-12-03 VITALS — HEIGHT: 73 IN | BODY MASS INDEX: 31.54 KG/M2 | WEIGHT: 238 LBS

## 2019-12-03 PROCEDURE — 99024 POSTOP FOLLOW-UP VISIT: CPT

## 2019-12-03 PROCEDURE — 73502 X-RAY EXAM HIP UNI 2-3 VIEWS: CPT

## 2019-12-03 PROCEDURE — 73502 X-RAY EXAM HIP UNI 2-3 VIEWS: CPT | Mod: 26,LT

## 2019-12-03 RX ORDER — TRAMADOL HYDROCHLORIDE 50 MG/1
50 TABLET, COATED ORAL
Qty: 15 | Refills: 0 | Status: DISCONTINUED | COMMUNITY
Start: 2019-11-05 | End: 2019-12-03

## 2019-12-03 RX ORDER — APIXABAN 5 MG/1
TABLET, FILM COATED ORAL
Refills: 0 | Status: COMPLETED | COMMUNITY

## 2019-12-03 RX ORDER — APIXABAN 5 MG/1
5 TABLET, FILM COATED ORAL TWICE DAILY
Qty: 60 | Refills: 0 | Status: ACTIVE | COMMUNITY
Start: 2019-12-03 | End: 1900-01-01

## 2019-12-03 NOTE — HISTORY OF PRESENT ILLNESS
[Doing Well] : is doing well [Excellent Pain Control] : has excellent pain control [No Sign of Infection] : is showing no signs of infection [Clean/Dry/Intact] : clean, dry and intact [Chills] : no chills [Fever] : no fever [de-identified] : 64 year old male presents for first post op. He is doing well. He reports mild, daily left hip pain. He also reports lateral left calf pain that he says keeps him up at night. He is unable to place shoes on his left foot. Patient can walk less than 5 blocks with two crutches. He has difficulty using stairs. He reports that he can enter public transportation but is unable to sit comfortably in any chair. He is taking 8 Gabapentin 100 mg pills daily for pain.\par Mr. Maya has had left foot drop since the operation and reports that he is very concerned about this. He presents today with a left foot splint and reports that he has been doing left foot stretching exercises at home.\par Patient had a post-operative left LE DVT and is on Eliquis for this.\par Patient presents with his wife.  [de-identified] : Patient is alert and oriented x3.\par Left leg appears clinically to be 3-4 millimeters longer than the right.\par Using crutches. Left foot splint.\par \par Left hip: Well-healing surgical scar without erythema or ecchymosis. Acceptable post-op swelling.  \par Focal area of tenderness and firmness in the calf in the area where bruising was noted postoperatively at the proximal lateral calf, a few inches distal to the fibular head.\par \par Normal sensation to touch in lateral, medial, posterior, anterolateral, and anteromedial calf.\par Diminished sensation to touch on medial aspect of foot. Virtually absent sensation to touch in first dorsal web space. Near normal sensation to dorsal aspect of foot.  Left Foot: No appreciable dorsiflexion or eversion power. Diminished inversion power 3/5.  Plantar flexion power 5/5. [de-identified] : First post op left total hip revision, explant and placement of antibiotic spacer, surgical date 11/11/19 [de-identified] : X-ray imaging of the AP pelvis and bilateral hips done here today demonstrates well-fixed, well-aligned bilateral THAs, the right is a cementless primary implant, the left is a cemented primary implant used as a spacer after his recent infection. [de-identified] : Mr. Maya is doing well. I removed stitches in office and placed Steri-Strips. I informed him that the Steri-Strips will stay on for a few days but then they may fall off in the shower and that's okay. He should abide by hip precautions for about 6 weeks after the operation. I encouraged him to stretch his left calf before bed to try and reduce his nighttime pain. He should continue to walk with a cane or crutches but can put as much weight on his left leg as is comfortable. I informed patient that he will be on IV antibiotics for at least 6 weeks after the operation and then we will determine whether or not he is ready to have the second stage of his operation. I told patient that he may not be ready for another operation at that time.\par I refilled patient's Eliquis prescription but informed him that for the long term he should get refills from his PCP or vascular doctor. I refilled his Gabapentin prescription and wrote a physical therapy prescription. \par We discussed patient's left foot drop and I informed him that at about 6 weeks after surgery he should do an EMG test with neurologist, Dr. Roberts. I encouraged him to continue stretching his left calf and foot and told him to do this with his left knee bent.\par Follow up December 23 and again in approximately the second week of January, pending progress.

## 2019-12-03 NOTE — END OF VISIT
[FreeTextEntry3] : All medical record entries made by Vanessa Hanan acting as a scribe for the performing provider (Rogerio Mead MD and/or JULIA Birmingham) on 12/03/2019. All entries were dictated to me by the performing medical provider. In signing this record, the medical provider affirms that they have personally performed the history, physical exam, assessment and plan and have also directed, reviewed and agreed to the documentation in the chart.

## 2019-12-11 LAB
CULTURE RESULTS: SIGNIFICANT CHANGE UP
SPECIMEN SOURCE: SIGNIFICANT CHANGE UP

## 2019-12-16 ENCOUNTER — MEDICATION RENEWAL (OUTPATIENT)
Age: 64
End: 2019-12-16

## 2019-12-20 ENCOUNTER — APPOINTMENT (OUTPATIENT)
Dept: INFECTIOUS DISEASE | Facility: CLINIC | Age: 64
End: 2019-12-20
Payer: MEDICARE

## 2019-12-20 VITALS
BODY MASS INDEX: 31.14 KG/M2 | WEIGHT: 235 LBS | OXYGEN SATURATION: 96 % | TEMPERATURE: 97.7 F | RESPIRATION RATE: 16 BRPM | HEIGHT: 73 IN | HEART RATE: 77 BPM | SYSTOLIC BLOOD PRESSURE: 144 MMHG | DIASTOLIC BLOOD PRESSURE: 83 MMHG

## 2019-12-20 PROCEDURE — 99213 OFFICE O/P EST LOW 20 MIN: CPT

## 2019-12-20 NOTE — ASSESSMENT
[FreeTextEntry1] : 65 yo M with HTN, HLD, CAD s/p stents, B THR with PPJI L hip, initial onset early after surgery in 4/2019, failed DAIR/prolonged IV antibiotics, now s/p explantation and spacer placement (metal containing), course c/b L foot drop and femoral DVT.  OR cultures grew MSSA.  He is clinically improved on nafcillin and rifampin.  ESR has steadily declined, CRP now WNL.  Per patient, tentative surgery date is for February.\par Plan:\par - Continue nafcillin 2g IV q 4 h, end date 12/23\par - Continue rifampin 300 mg PO q 12 h, end date 12/23\par - PICC to be removed after final dose\par - Follow up with Dr. Mead as planned\par Will determine need for additional antibiotics and further f/u after surgery plan is determined - will discuss with Dr. Mead.

## 2019-12-20 NOTE — PHYSICAL EXAM
[General Appearance - Alert] : alert [General Appearance - In No Acute Distress] : in no acute distress [Sclera] : the sclera and conjunctiva were normal [PERRL With Normal Accommodation] : pupils were equal in size, round, reactive to light [Outer Ear] : the ears and nose were normal in appearance [Examination Of The Oral Cavity] : the lips and gums were normal [Oropharynx] : the oropharynx was normal with no thrush [Auscultation Breath Sounds / Voice Sounds] : lungs were clear to auscultation bilaterally [Heart Sounds] : normal S1 and S2 [Heart Rate And Rhythm] : heart rate was normal and rhythm regular [Murmurs] : no murmurs [Bowel Sounds] : normal bowel sounds [Abdomen Soft] : soft [Abdomen Tenderness] : non-tender [Costovertebral Angle Tenderness] : no CVA tenderness [No Palpable Adenopathy] : no palpable adenopathy [Skin Color & Pigmentation] : normal skin color and pigmentation [] : no rash [FreeTextEntry1] : L hip incision well-healed.  No surrounding erythema, edema or warmth.  No hip tenderness.  LUE PICC site clean.  Brace in place for footdrop.  ~trace pitting edema bilaterally

## 2019-12-20 NOTE — HISTORY OF PRESENT ILLNESS
[FreeTextEntry1] : 64 year old male with HTN, HLD, CAD s/p multiple stents, s/p B THR with PPJI L hip.  He had L THR on 4/25/19 c/b infection with DAIR 5/22/19.  Per patient, cultures grew Staph aureus.  He was treated with nafcillin and rifampin X 8 weeks, then an oral antibiotic x 3 months, complete 10/18.  He was initially seen by Dr. Mead on 6/19.  He states that he had ongoing pain throughout but that he became worse immediately after d/cing antibiotics.  He was seen for f/u by Dr. Mead on 10/31.  At that time, he reported having received a cortisone injection into the hip for trochanteric bursitis.  He was found to have mild generalized erythema around the incision and a fluctuant peritrochanteric collection s/p diagnostic aspiration.  Fluid was turbid with 172,000 nucleated cells (98% PMNs), 28,000 RBCs.  Culture grew numerous MSSA (S rif).  He was started on cephalexin on 11/5 and admitted on 11/10.  On 11/11, he underwent explantation and revision with spacer insertion (metal containing).  He was started on cefazolin.  OR cultures grew MSSA.  On 11/12 cefazolin was discontinued and he was started on nafcillin and rifampin.  Course was c/b L foot drop and femoral DVT (on apixaban). PICC placed 11/15.  He was discharged 11/19 to continue nafcillin 2 g IV q 4 h and rifampin 300 mg PO q 12 h x 6 weeks, end date 12/23.  He saw Dr. Mead for f/u 12/3, stitches removed.  Next f/u is 12/23. EMG is scheduled for 12/30. He began having intermittent sensation in 4th & 5th toes of L foot a few weeks ago, now also lateral aspect of 3rd toe.

## 2019-12-23 ENCOUNTER — APPOINTMENT (OUTPATIENT)
Dept: ORTHOPEDIC SURGERY | Facility: CLINIC | Age: 64
End: 2019-12-23
Payer: MEDICARE

## 2019-12-23 DIAGNOSIS — Z47.1 AFTERCARE FOLLOWING JOINT REPLACEMENT SURGERY: ICD-10-CM

## 2019-12-23 DIAGNOSIS — M21.379 FOOT DROP, UNSPECIFIED FOOT: ICD-10-CM

## 2019-12-23 DIAGNOSIS — M21.372 FOOT DROP, LEFT FOOT: ICD-10-CM

## 2019-12-23 DIAGNOSIS — T84.59XD INFECTION AND INFLAMMATORY REACTION DUE TO OTHER INTERNAL JOINT PROSTHESIS, SUBSEQUENT ENCOUNTER: ICD-10-CM

## 2019-12-23 DIAGNOSIS — I82.412 ACUTE EMBOLISM AND THROMBOSIS OF LEFT FEMORAL VEIN: ICD-10-CM

## 2019-12-23 DIAGNOSIS — Z96.642 AFTERCARE FOLLOWING JOINT REPLACEMENT SURGERY: ICD-10-CM

## 2019-12-23 DIAGNOSIS — Z96.649 INFECTION AND INFLAMMATORY REACTION DUE TO OTHER INTERNAL JOINT PROSTHESIS, SUBSEQUENT ENCOUNTER: ICD-10-CM

## 2019-12-23 PROCEDURE — 99024 POSTOP FOLLOW-UP VISIT: CPT

## 2019-12-23 RX ORDER — WARFARIN 2 MG/1
2 TABLET ORAL
Qty: 30 | Refills: 0 | Status: DISCONTINUED | COMMUNITY
Start: 2019-06-27

## 2019-12-23 RX ORDER — NAFCILLIN 10 G/100ML
INJECTION, POWDER, FOR SOLUTION INTRAVENOUS
Refills: 0 | Status: DISCONTINUED | COMMUNITY
End: 2019-12-23

## 2019-12-23 RX ORDER — CEPHALEXIN 500 MG/1
500 CAPSULE ORAL 4 TIMES DAILY
Qty: 56 | Refills: 0 | Status: DISCONTINUED | COMMUNITY
Start: 2019-11-05 | End: 2019-12-23

## 2019-12-23 RX ORDER — RIFAMPIN 300 MG/1
CAPSULE ORAL
Refills: 0 | Status: COMPLETED | COMMUNITY

## 2019-12-23 RX ORDER — RIFAMPIN 300 MG/1
300 CAPSULE ORAL DAILY
Qty: 60 | Refills: 0 | Status: ACTIVE | COMMUNITY
Start: 2019-12-23 | End: 1900-01-01

## 2019-12-23 RX ORDER — CYANOCOBALAMIN (VITAMIN B-12) 1000 MCG
TABLET ORAL
Refills: 0 | Status: ACTIVE | COMMUNITY

## 2019-12-23 RX ORDER — CEFADROXIL 500 MG/1
500 CAPSULE ORAL
Qty: 60 | Refills: 0 | Status: DISCONTINUED | COMMUNITY
Start: 2019-09-04

## 2019-12-23 RX ORDER — MELOXICAM 7.5 MG/1
7.5 TABLET ORAL
Qty: 30 | Refills: 0 | Status: DISCONTINUED | COMMUNITY
Start: 2019-10-07

## 2019-12-23 RX ORDER — CHLORHEXIDINE GLUCONATE 4 %
LIQUID (ML) TOPICAL
Refills: 0 | Status: ACTIVE | COMMUNITY

## 2019-12-23 RX ORDER — DICLOXACILLIN SODIUM 500 MG/1
500 CAPSULE ORAL 4 TIMES DAILY
Qty: 120 | Refills: 0 | Status: ACTIVE | COMMUNITY
Start: 2019-12-23 | End: 1900-01-01

## 2019-12-23 RX ORDER — CARVEDILOL 12.5 MG/1
12.5 TABLET, FILM COATED ORAL
Qty: 180 | Refills: 0 | Status: ACTIVE | COMMUNITY
Start: 2019-09-22

## 2019-12-23 RX ORDER — DICLOFENAC POTASSIUM 50 MG/1
50 TABLET, COATED ORAL
Qty: 90 | Refills: 0 | Status: DISCONTINUED | COMMUNITY
Start: 2019-10-28

## 2019-12-23 RX ORDER — AMOXICILLIN 500 MG/1
500 CAPSULE ORAL
Qty: 4 | Refills: 0 | Status: DISCONTINUED | COMMUNITY
Start: 2019-07-25

## 2019-12-23 NOTE — HISTORY OF PRESENT ILLNESS
[Excellent Pain Control] : has excellent pain control [No Sign of Infection] : is showing no signs of infection [Chills] : no chills [Vomiting] : no vomiting [Fever] : no fever [Nausea] : no nausea [Erythema] : not erythematous [Clean/Dry/Intact] : clean, dry and intact [Healed] : healed [Dehiscence] : not dehisced [Discharge] : absent of discharge [Slow Progress] : is progressing slowly [de-identified] : Second post of left total hip revision,  explant and placement of antibiotic spacer, surgical date 11/11/19.  [de-identified] : Patient reports improving pain and function related to left hip but still with foot drop.  Alternates AFO devices as each bothers him in a different way.  Lateral calf pain is diminished but persists.  Today completes 6 weeks of IV nafcillin and oral rifampin. [None] : None [de-identified] : Alert and oriented x3.\par Limb lengths: Previously apparent ~ 4 mm leg length difference not apparent today when examined supine.\par Left hip: Well healed lateral surgical scar.  Skin intact. No erythema, ecchymosis or swelling.  No tenderness. Flexion 105, adduction 10, IR 10, ER 55, abd 55. Flexor/abductor power 4/5.\par Hamstring power 4-/5.\par TA/EHL 0/5.  Eversion 0/5.  GS 5/5.  Inversion 1-2/5.\par Sensation absent in first dorsal webspace but improving near normal on dorsum of foot and normal on medial aspect of foot.\par Disposable NMES device at fibular head resulted in brisk contractions at right LE and faint response at left LE, suggesting loss of axonal integrity on left. [de-identified] : Case discussed with Dr. Schreiber.  Will switch nafcillin to dicloxicillin 500 QID and continue rifampin until ~ 3 weeks before planned reimplantation.  Reimplant should be ~ 3 months post-op to allow maturation of DVT.  Scheduled for EMG/NCS in one week, which will inform prognosis for foot drop and possibly treatment.  Will see him again in ~ 3 weeks and decide schedule for reimplant.

## 2019-12-31 RX ORDER — GABAPENTIN 100 MG/1
100 CAPSULE ORAL
Qty: 108 | Refills: 0 | Status: ACTIVE | COMMUNITY
Start: 2019-11-26 | End: 1900-01-01

## 2020-01-09 ENCOUNTER — MEDICATION RENEWAL (OUTPATIENT)
Age: 65
End: 2020-01-09

## 2020-01-09 RX ORDER — OXYCODONE AND ACETAMINOPHEN 5; 325 MG/1; MG/1
5-325 TABLET ORAL
Qty: 50 | Refills: 0 | Status: ACTIVE | COMMUNITY
Start: 2019-11-25 | End: 1900-01-01

## 2020-01-31 ENCOUNTER — APPOINTMENT (OUTPATIENT)
Dept: INTERVENTIONAL RADIOLOGY/VASCULAR | Facility: HOSPITAL | Age: 65
End: 2020-01-31

## 2020-02-04 ENCOUNTER — APPOINTMENT (OUTPATIENT)
Dept: VASCULAR SURGERY | Facility: CLINIC | Age: 65
End: 2020-02-04

## 2020-02-04 ENCOUNTER — APPOINTMENT (OUTPATIENT)
Dept: MRI IMAGING | Facility: HOSPITAL | Age: 65
End: 2020-02-04

## 2022-01-24 NOTE — CONSULT NOTE ADULT - ATTENDING COMMENTS
Agree with the above findings and statements    Patient should be anticoagulated given findings of DVT    F/u in my office in 2-4 weeks for repeat US and evaluation
Patient seen and examined with house-staff during bedside rounds.  Resident note read, including vitals, physical findings, laboratory data, and radiological reports.   Revisions included below.  Direct personal management at bed side and extensive interpretation of the data.  Plan was outlined and discussed in details with the housestaff.  Decision making of high complexity  Action taken for acute disease activity to reflect the level of care provided:  - medication reconciliation  - review laboratory data
spouse

## 2022-05-31 NOTE — PRE-OP CHECKLIST - HAND OFF
yes Show Topical Anesthesia Variable?: Yes Post-Care Instructions: I reviewed with the patient in detail post-care instructions. Patient is to wear sunprotection, and avoid picking at any of the treated lesions. Pt may apply Vaseline to crusted or scabbing areas. Detail Level: Detailed Spray Paint Technique: No Medical Necessity Information: It is in your best interest to select a reason for this procedure from the list below. All of these items fulfill various CMS LCD requirements except the new and changing color options. Medical Necessity Clause: This procedure was medically necessary because the lesions that were treated were: Spray Paint Text: The liquid nitrogen was applied to the skin utilizing a spray paint frosting technique. Consent: The patient's consent was obtained including but not limited to risks of crusting, scabbing, blistering, scarring, darker or lighter pigmentary change, recurrence, incomplete removal and infection.

## 2024-11-12 NOTE — PROGRESS NOTE ADULT - PROBLEM SELECTOR PLAN 5
The patient is hemodynamically stable.  The pain is controlled.  Patient is on DVT prophylaxis.  Patient is using incentive spirometry.  Oxygen saturation is acceptable.  Advance diet as tolerated.  Advance activity as tolerated.  Monitor for ileus.  Patient is on Laxatives.  Surgical wound is stable.  No indication for monitor bed.
How Many Skin Cancers Have You Had?: more than one
What Is The Reason For Today's Visit?: History of Non-Melanoma Skin Cancer
When Was Your Last Cancer Diagnosed?: 2021